# Patient Record
Sex: MALE | Race: BLACK OR AFRICAN AMERICAN | Employment: OTHER | URBAN - METROPOLITAN AREA
[De-identification: names, ages, dates, MRNs, and addresses within clinical notes are randomized per-mention and may not be internally consistent; named-entity substitution may affect disease eponyms.]

---

## 2017-09-14 PROBLEM — I10 UNCONTROLLED HYPERTENSION: Status: ACTIVE | Noted: 2017-09-14

## 2017-09-14 PROBLEM — F10.10 ALCOHOL ABUSE: Status: ACTIVE | Noted: 2017-09-14

## 2019-03-19 PROBLEM — I10 UNCONTROLLED HYPERTENSION: Status: RESOLVED | Noted: 2017-09-14 | Resolved: 2019-03-19

## 2019-03-19 PROBLEM — F10.10 ALCOHOL ABUSE: Status: RESOLVED | Noted: 2017-09-14 | Resolved: 2019-03-19

## 2019-10-09 ENCOUNTER — HOSPITAL ENCOUNTER (OUTPATIENT)
Dept: ULTRASOUND IMAGING | Age: 69
Discharge: HOME | End: 2019-10-09
Attending: FAMILY MEDICINE
Payer: MEDICARE

## 2019-10-09 DIAGNOSIS — F17.200 TOBACCO USE DISORDER: ICD-10-CM

## 2019-10-09 PROCEDURE — 93978 VASCULAR STUDY: CPT

## 2021-02-18 PROBLEM — Z12.11 ENCOUNTER FOR SCREENING COLONOSCOPY: Status: ACTIVE | Noted: 2021-02-18

## 2021-03-20 PROBLEM — Z12.11 ENCOUNTER FOR SCREENING COLONOSCOPY: Status: RESOLVED | Noted: 2021-02-18 | Resolved: 2021-03-20

## 2021-04-20 ENCOUNTER — ANESTHESIA EVENT (OUTPATIENT)
Dept: ENDOSCOPY | Age: 71
End: 2021-04-20
Payer: MEDICARE

## 2021-04-21 ENCOUNTER — ANESTHESIA (OUTPATIENT)
Dept: ENDOSCOPY | Age: 71
End: 2021-04-21
Payer: MEDICARE

## 2021-04-21 ENCOUNTER — HOSPITAL ENCOUNTER (OUTPATIENT)
Age: 71
Setting detail: OUTPATIENT SURGERY
Discharge: HOME | End: 2021-04-21
Attending: SURGERY | Admitting: SURGERY
Payer: MEDICARE

## 2021-04-21 VITALS
OXYGEN SATURATION: 94 % | SYSTOLIC BLOOD PRESSURE: 117 MMHG | HEART RATE: 49 BPM | TEMPERATURE: 98.7 F | RESPIRATION RATE: 16 BRPM | DIASTOLIC BLOOD PRESSURE: 68 MMHG

## 2021-04-21 DIAGNOSIS — Z12.11 ENCOUNTER FOR SCREENING COLONOSCOPY: ICD-10-CM

## 2021-04-21 PROCEDURE — 74011000250 HC RX REV CODE- 250: Performed by: NURSE ANESTHETIST, CERTIFIED REGISTERED

## 2021-04-21 PROCEDURE — 77030013991 HC SNR POLYP ENDOSC BSC -A: Performed by: SURGERY

## 2021-04-21 PROCEDURE — 74011250636 HC RX REV CODE- 250/636: Performed by: NURSE ANESTHETIST, CERTIFIED REGISTERED

## 2021-04-21 PROCEDURE — 45385 COLONOSCOPY W/LESION REMOVAL: CPT | Performed by: SURGERY

## 2021-04-21 PROCEDURE — 77030021593 HC FCPS BIOP ENDOSC BSC -A: Performed by: SURGERY

## 2021-04-21 PROCEDURE — 2709999900 HC NON-CHARGEABLE SUPPLY: Performed by: SURGERY

## 2021-04-21 PROCEDURE — 76060000032 HC ANESTHESIA 0.5 TO 1 HR: Performed by: SURGERY

## 2021-04-21 PROCEDURE — 76040000026: Performed by: SURGERY

## 2021-04-21 PROCEDURE — 74011250636 HC RX REV CODE- 250/636: Performed by: ANESTHESIOLOGY

## 2021-04-21 PROCEDURE — 88305 TISSUE EXAM BY PATHOLOGIST: CPT

## 2021-04-21 RX ORDER — SODIUM CHLORIDE, SODIUM LACTATE, POTASSIUM CHLORIDE, CALCIUM CHLORIDE 600; 310; 30; 20 MG/100ML; MG/100ML; MG/100ML; MG/100ML
100 INJECTION, SOLUTION INTRAVENOUS CONTINUOUS
Status: DISCONTINUED | OUTPATIENT
Start: 2021-04-21 | End: 2021-04-22 | Stop reason: HOSPADM

## 2021-04-21 RX ORDER — PROPOFOL 10 MG/ML
INJECTION, EMULSION INTRAVENOUS
Status: DISCONTINUED | OUTPATIENT
Start: 2021-04-21 | End: 2021-04-21 | Stop reason: HOSPADM

## 2021-04-21 RX ORDER — LIDOCAINE HYDROCHLORIDE 20 MG/ML
INJECTION, SOLUTION EPIDURAL; INFILTRATION; INTRACAUDAL; PERINEURAL AS NEEDED
Status: DISCONTINUED | OUTPATIENT
Start: 2021-04-21 | End: 2021-04-21 | Stop reason: HOSPADM

## 2021-04-21 RX ORDER — PROPOFOL 10 MG/ML
INJECTION, EMULSION INTRAVENOUS AS NEEDED
Status: DISCONTINUED | OUTPATIENT
Start: 2021-04-21 | End: 2021-04-21 | Stop reason: HOSPADM

## 2021-04-21 RX ADMIN — PROPOFOL 300 MCG/KG/MIN: 10 INJECTION, EMULSION INTRAVENOUS at 08:27

## 2021-04-21 RX ADMIN — SODIUM CHLORIDE, SODIUM LACTATE, POTASSIUM CHLORIDE, AND CALCIUM CHLORIDE: 600; 310; 30; 20 INJECTION, SOLUTION INTRAVENOUS at 08:25

## 2021-04-21 RX ADMIN — SODIUM CHLORIDE, SODIUM LACTATE, POTASSIUM CHLORIDE, AND CALCIUM CHLORIDE 100 ML/HR: 600; 310; 30; 20 INJECTION, SOLUTION INTRAVENOUS at 08:04

## 2021-04-21 RX ADMIN — LIDOCAINE HYDROCHLORIDE 60 MG: 20 INJECTION, SOLUTION EPIDURAL; INFILTRATION; INTRACAUDAL; PERINEURAL at 08:27

## 2021-04-21 RX ADMIN — PROPOFOL 50 MG: 10 INJECTION, EMULSION INTRAVENOUS at 08:27

## 2021-04-21 NOTE — OP NOTES
[address] [facility]  OPERATIVE REPORT    Name:  Arley Venegas  MR#:  [id number]  :  1950  ACCOUNT #:  [de-identified]  DATE OF SERVICE:  2021    PREOPERATIVE DIAGNOSIS:  Screening colonoscopy. POSTOPERATIVE DIAGNOSIS:  Two polyps in the ascending colon. PROCEDURE PERFORMED:  Colonoscopy with biopsy of polyp in the proximal ascending colon and snare polypectomy with cautery in the mid ascending colon, otherwise normal exam.    SURGEON:  Mark Arceo. Shahida Hanson MD    ASSISTANT:  None. ANESTHESIA:  MAC.    COMPLICATIONS:  None. SPECIMENS REMOVED:  Biopsies of polyp in the proximal ascending colon and polyp from the mid ascending colon. IMPLANTS:  None. ESTIMATED BLOOD LOSS:  Minimal.    COUNT:  Correct. PROCEDURE:  After the patient was brought into the room, time-out was carried out and all were in agreement. He was rolled onto his left side. MAC anesthesia administered. Scope was gently inserted into the colon. This was passed to the level of the cecum without difficulty. Over 7-minute withdrawal was then done. Finding of a small hyperplastic-appearing polyp was seen in the very proximal ascending colon. This was biopsied multiple times. This was sent to Pathology. Further withdrawal revealed a larger polypoid polyp which was snared and transected completely using electrocautery. This was removed in its entirety. The base of the polypectomy site was dry without bleeding. Polyp was retrieved and stent to Pathology. Slow withdrawal was then done without findings of any other abnormalities. The patient tolerated the procedure well.       Trisha Martini MD      [id number]  D:  2021 9:05  T:  2021 12:52  JOB #:  [id number]  CC:  Abdullahi Chowdhury MD

## 2021-04-21 NOTE — DISCHARGE INSTRUCTIONS
Gastrointestinal Colonoscopy/Flexible Sigmoidoscopy - Lower Exam Discharge Instructions  1. Call Dr. Laya Anderson at his office for any problems or questions. 2. Contact the doctors office for follow up appointment as directed  3. Medication may cause drowsiness for several hours, therefore:  · Do not drive or operate machinery for reminder of the day. · No alcohol today. · Do not make any important or legal decisions for 24 hours. · Do not sign any legal documents for 24 hours. 4. Ordinarily, you may resume regular diet and activity after exam unless otherwise specified by your physician. 5. Because of air put into your colon during exam, you may experience some abdominal distension, relieved by the passage of gas, for several hours. 6. Contact your physician if you have any of the following:  · Excessive amount of bleeding - large amount when having a bowel movement. Occasional specks of blood with bowel movement would not be unusual.  · Severe abdominal pain  · Fever or Chills  · No Aspirin, Advil, Aleve, Nuprin, Ibuprofen, or medications that contain these drugs for 2 weeks. Any additional instructions:  Call the office next week for pathology results, based on pathology repeat colonoscopy in 2-5 years      Instructions given to Denise Mcmullen and other family members.   Instructions given by:

## 2021-04-21 NOTE — ANESTHESIA PREPROCEDURE EVALUATION
Anesthetic History   No history of anesthetic complications            Review of Systems / Medical History  Pertinent labs reviewed    Pulmonary          Smoker         Neuro/Psych   Within defined limits           Cardiovascular    Hypertension              Exercise tolerance: >4 METS     GI/Hepatic/Renal           PUD    Comments: H/o GI bleed Endo/Other             Other Findings   Comments: Heavy EtOH use           Physical Exam    Airway  Mallampati: II  TM Distance: 4 - 6 cm  Neck ROM: normal range of motion   Mouth opening: Normal     Cardiovascular  Regular rate and rhythm,  S1 and S2 normal,  no murmur, click, rub, or gallop             Dental    Dentition: Poor dentition, Upper partial plate and Lower partial plate  Comments: Mult missing teeth   Pulmonary  Breath sounds clear to auscultation               Abdominal  GI exam deferred       Other Findings            Anesthetic Plan    ASA: 3  Anesthesia type: total IV anesthesia          Induction: Intravenous  Anesthetic plan and risks discussed with: Patient

## 2021-04-21 NOTE — PROGRESS NOTES
Spiritual Care visit. Initial Visit, Pre Surgery Consult. Visit and prayer before patient goes to surgery.     Visit by Vu Hanson M.Ed., Th.B. ,Staff

## 2021-04-21 NOTE — H&P
[facility]  Micaela Yin is a 72 y.o. male Patient presents today for screening colonoscopy. Patient denies melena, denies hematochezia, denies abdominal or rectal pain. Patient states bowel habits are good, denies diarrhea or constipation. Good appetite, no unintentional weight loss noted. Denies N/V. Denies CP or SOB. He has had a colonoscopy before but it is years ago when he cannot remember. He thinks he had 2 polyps removed but they were benign.     Does not  have a family history of colon cancer. Does not take blood thinners                Past Medical History:   Diagnosis Date    Alcohol abuse 9/14/2017    Essential hypertension, benign 1/16/2011    Gastrointestinal disorder       ulcer    GI bleed 5/30/2016    Hyponatremia 5/30/2016    Leukocytosis 5/30/2016             Current Outpatient Medications   Medication Sig Dispense Refill    lisinopriL (PRINIVIL, ZESTRIL) 10 mg tablet Take 1 Tab by mouth every morning. 90 Tab 1    atorvastatin (LIPITOR) 40 mg tablet Take 1 Tab by mouth daily. 90 Tab 1    amLODIPine (NORVASC) 5 mg tablet Take 1 Tab by mouth nightly. 90 Tab 1      No Known Allergies  History reviewed. No pertinent surgical history. Family History   Problem Relation Age of Onset   Scheryl Gemma Cancer Mother      Diabetes Mother      Other Father           ulcer    Hypertension Brother      Other Brother           ulcer    Diabetes Sister        Social History            Tobacco Use    Smoking status: Current Some Day Smoker       Packs/day: 0.50    Smokeless tobacco: Never Used    Tobacco comment: working on cutting down and then quitting   Substance Use Topics    Alcohol use: Yes       Alcohol/week: 14.0 standard drinks       Types: 14 Cans of beer per week       Comment: daily         Review of Systems   A comprehensive review of systems was negative except for that written in the HPI.      Physical Exam  Visit Vitals  BP (!) 158/92   Pulse (!) 113   Ht 5' 2.5\" (1.588 m)   Wt 143 lb (64.9 kg)   SpO2 100%   BMI 25.74 kg/m²         Constitutional: Alert, oriented, cooperative patient in no acute distress; appears stated age    Eyes:Sclera are clear. EOMs intact  ENMT: no external lesions gross hearing normal; no obvious neck masses, no ear or lip lesions, nares normal  CV: RRR. Normal perfusion  Resp: No JVD. Breathing is  non-labored; no audible wheezing. GI: soft and non-distended     Musculoskeletal: unremarkable with normal function. No embolic signs or cyanosis. Neuro:  Oriented; moves all 4; no focal deficits  Psychiatric: normal affect and mood, no memory impairment        Labs:      Assessment/Plan:   Rodrigo Bernal is a 72 y.o. male who has signs and symptoms consistent with need for screening colonoscopy. Patient verbalized understanding of importance for bowel prep.      1. Schedule screening colonoscopy        I discussed the patient's condition and treatment options with the patient. I discussed risks of colonoscopy in language the patient could understand including bleeding, infection, aspiration, perforation, medication reaction, need for further endoscopy or surgery, abscess, fistula, SBO, DVT, PE, heart attack, stroke, renal failure, respiratory failure, ventilatory dependence, and death. The patient voiced understanding of all this and all questions were answered. Alternatives to colonoscopy were discussed also and risks of the alternatives. The patient requested that we proceed with colonoscopy. Informed consent was obtained. A copy of this note is sent to the referring physician. I spent 20 minutes with him this morning and greater than 50% of this time was spent in counseling.   The patient was counseled at length about the risks of ricci Covid-19 during their perioperative period and any recovery window from their procedure.  The patient was made aware that ricci Covid-19  may worsen their prognosis for recovering from their procedure and lend to a higher morbidity and/or mortality risk.  All material risks, benefits, and reasonable alternatives including postponing the procedure were discussed.  The patient does  wish to proceed with the procedure at this time.               Electronically signed by Lalit Estrada MD

## 2021-04-21 NOTE — BRIEF OP NOTE
Brief Postoperative Note    Patient: Mao Hooks  YOB: 1950  MRN: [id number]    Date of Procedure: 4/21/2021     Pre-Op Diagnosis: Screen for colon cancer [Z12.11]    Post-Op Diagnosis: 2 polyps ascending colon      Procedure(s):  COLONOSCOPY/ 26  COLON BIOPSY  ENDOSCOPIC POLYPECTOMY hot snare    Surgeon(s):  Lisa Casas MD    Surgical Assistant: None    Anesthesia: MAC     Estimated Blood Loss (mL): Minimal    Complications: None    Specimens:   ID Type Source Tests Collected by Time Destination   1 : proximal ascending colon bx Preservative Colon, Ascending  Lisa Casas MD 4/21/2021 9969 Pathology   2 : mid ascending colon polyp Preservative Colon, Ascending  Lisa Casas MD 4/21/2021 2787 Pathology        Implants: * No implants in log *    Drains: * No LDAs found *    Findings: see op note    Electronically Signed by Patricio Montes De Oca MD on 4/21/2021 at 8:59 AM

## 2021-04-21 NOTE — ANESTHESIA POSTPROCEDURE EVALUATION
Procedure(s):  COLONOSCOPY/ 26  COLON BIOPSY  ENDOSCOPIC POLYPECTOMY hot snare. total IV anesthesia    Anesthesia Post Evaluation        Patient location during evaluation: PACU  Patient participation: complete - patient participated  Level of consciousness: awake  Pain management: adequate  Airway patency: patent  Anesthetic complications: no  Cardiovascular status: acceptable  Respiratory status: acceptable  Hydration status: acceptable  Post anesthesia nausea and vomiting:  controlled  Final Post Anesthesia Temperature Assessment:  Normothermia (36.0-37.5 degrees C)      INITIAL Post-op Vital signs:   Vitals Value Taken Time   /68 04/21/21 0922   Temp     Pulse 60 04/21/21 0930   Resp 16 04/21/21 0922   SpO2 95 % 04/21/21 0930   Vitals shown include unvalidated device data.

## 2022-11-03 ENCOUNTER — OFFICE VISIT (OUTPATIENT)
Dept: FAMILY MEDICINE CLINIC | Facility: CLINIC | Age: 72
End: 2022-11-03
Payer: MEDICARE

## 2022-11-03 VITALS
OXYGEN SATURATION: 100 % | TEMPERATURE: 96.6 F | HEART RATE: 69 BPM | WEIGHT: 144 LBS | BODY MASS INDEX: 22.6 KG/M2 | HEIGHT: 67 IN | SYSTOLIC BLOOD PRESSURE: 184 MMHG | RESPIRATION RATE: 18 BRPM | DIASTOLIC BLOOD PRESSURE: 103 MMHG

## 2022-11-03 DIAGNOSIS — E78.00 PURE HYPERCHOLESTEROLEMIA, UNSPECIFIED: ICD-10-CM

## 2022-11-03 DIAGNOSIS — E78.00 PURE HYPERCHOLESTEROLEMIA, UNSPECIFIED: Primary | ICD-10-CM

## 2022-11-03 DIAGNOSIS — I10 ESSENTIAL (PRIMARY) HYPERTENSION: ICD-10-CM

## 2022-11-03 PROCEDURE — 1123F ACP DISCUSS/DSCN MKR DOCD: CPT | Performed by: FAMILY MEDICINE

## 2022-11-03 PROCEDURE — 3078F DIAST BP <80 MM HG: CPT | Performed by: FAMILY MEDICINE

## 2022-11-03 PROCEDURE — 3074F SYST BP LT 130 MM HG: CPT | Performed by: FAMILY MEDICINE

## 2022-11-03 PROCEDURE — 99214 OFFICE O/P EST MOD 30 MIN: CPT | Performed by: FAMILY MEDICINE

## 2022-11-03 RX ORDER — LISINOPRIL 20 MG/1
20 TABLET ORAL DAILY
Qty: 90 TABLET | Refills: 1 | Status: SHIPPED | OUTPATIENT
Start: 2022-11-03 | End: 2022-11-18 | Stop reason: DRUGHIGH

## 2022-11-03 RX ORDER — ATORVASTATIN CALCIUM 40 MG/1
40 TABLET, FILM COATED ORAL DAILY
Qty: 90 TABLET | Refills: 1 | Status: SHIPPED | OUTPATIENT
Start: 2022-11-03

## 2022-11-03 RX ORDER — AMLODIPINE BESYLATE 5 MG/1
5 TABLET ORAL NIGHTLY
Qty: 90 TABLET | Refills: 1 | Status: SHIPPED | OUTPATIENT
Start: 2022-11-03

## 2022-11-03 ASSESSMENT — ENCOUNTER SYMPTOMS
CONSTIPATION: 0
DIARRHEA: 0
SHORTNESS OF BREATH: 0
ABDOMINAL PAIN: 0
NAUSEA: 0

## 2022-11-03 ASSESSMENT — PATIENT HEALTH QUESTIONNAIRE - PHQ9
SUM OF ALL RESPONSES TO PHQ9 QUESTIONS 1 & 2: 0
SUM OF ALL RESPONSES TO PHQ QUESTIONS 1-9: 0
SUM OF ALL RESPONSES TO PHQ QUESTIONS 1-9: 0
1. LITTLE INTEREST OR PLEASURE IN DOING THINGS: 0
2. FEELING DOWN, DEPRESSED OR HOPELESS: 0
SUM OF ALL RESPONSES TO PHQ QUESTIONS 1-9: 0
SUM OF ALL RESPONSES TO PHQ QUESTIONS 1-9: 0

## 2022-11-03 NOTE — PROGRESS NOTES
Phillip Powell (:  1950) is a 71 y.o. male,Established patient, here for evaluation of the following chief complaint(s):  6 Month Follow-Up (On HTN and Cholesterol) and Other (Has been over 10 years since the last Tdap and Pneumonia shot. Not sure about the Shingles vac.)         ASSESSMENT/PLAN:  1. Pure hypercholesterolemia, unspecified  -     atorvastatin (LIPITOR) 40 MG tablet; Take 1 tablet by mouth daily, Disp-90 tablet, R-1Normal  -     Lipid Panel; Future  2. Essential (primary) hypertension  -     amLODIPine (NORVASC) 5 MG tablet; Take 1 tablet by mouth at bedtime, Disp-90 tablet, R-1Normal  -     lisinopril (PRINIVIL;ZESTRIL) 20 MG tablet; Take 1 tablet by mouth daily, Disp-90 tablet, R-1Normal  -     Comprehensive Metabolic Panel; Future  -     Lipid Panel; Future  Increase lisinopril to 20mg and keep home bp log. Bring home bp log, home monitor, and come back for labs (bmp) and bp monitoring in 2 weeks. Will write a letter if bp appears to truly reflext white coat hypertension. No follow-ups on file. Subjective   SUBJECTIVE/OBJECTIVE:  HPI  Chief Complaint   Patient presents with    6 Month Follow-Up     On HTN and Cholesterol    Other     Has been over 10 years since the last Tdap and Pneumonia shot. Not sure about the Shingles vac. F/U HTN - BP not well controlled. Compliant w/ med(s). Denies s/s target organ damage. 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 F/U  Dyslipidemia -  Pt states compliant w/ regimen w/o medication adverse effects. Review of Systems   Constitutional:  Negative for fatigue and unexpected weight change. HENT:  Negative for congestion. Eyes:  Negative for visual disturbance. Respiratory:  Negative for shortness of breath. Cardiovascular:  Negative for chest pain, palpitations and leg swelling. Gastrointestinal:  Negative for abdominal pain, constipation, diarrhea and nausea. Genitourinary:  Negative for dysuria. Skin:  Negative for rash. Neurological:  Negative for light-headedness. Psychiatric/Behavioral:  Negative for dysphoric mood. Objective   Physical Exam  Vitals reviewed. Constitutional:       Appearance: Normal appearance. HENT:      Head: Normocephalic and atraumatic. Nose: Nose normal.      Mouth/Throat:      Mouth: Mucous membranes are moist.      Pharynx: Oropharynx is clear. Eyes:      Extraocular Movements: Extraocular movements intact. Conjunctiva/sclera: Conjunctivae normal.      Pupils: Pupils are equal, round, and reactive to light. Cardiovascular:      Rate and Rhythm: Normal rate and regular rhythm. Pulses: Normal pulses. Heart sounds: Normal heart sounds. Pulmonary:      Effort: Pulmonary effort is normal.      Breath sounds: Normal breath sounds. Abdominal:      General: Abdomen is flat. Bowel sounds are normal.      Palpations: Abdomen is soft. Musculoskeletal:         General: Normal range of motion. Skin:     General: Skin is warm and dry. Neurological:      General: No focal deficit present. Mental Status: He is alert. BP (!) 184/103 (Site: Left Upper Arm, Position: Sitting, Cuff Size: Medium Adult)   Pulse 69   Temp (!) 96.6 °F (35.9 °C)   Resp 18   Ht 5' 7\" (1.702 m)   Wt 144 lb (65.3 kg)   SpO2 100%   BMI 22.55 kg/m²          An electronic signature was used to authenticate this note.     --Moriah Jeter MD

## 2022-11-04 LAB
CHOLEST SERPL-MCNC: 186 MG/DL
HDLC SERPL-MCNC: 93 MG/DL (ref 40–60)
HDLC SERPL: 2 {RATIO}
LDLC SERPL CALC-MCNC: 65 MG/DL
TRIGL SERPL-MCNC: 140 MG/DL (ref 35–150)
VLDLC SERPL CALC-MCNC: 28 MG/DL (ref 6–23)

## 2022-11-17 ENCOUNTER — NURSE ONLY (OUTPATIENT)
Dept: FAMILY MEDICINE CLINIC | Facility: CLINIC | Age: 72
End: 2022-11-17

## 2022-11-17 ENCOUNTER — TELEPHONE (OUTPATIENT)
Dept: FAMILY MEDICINE CLINIC | Facility: CLINIC | Age: 72
End: 2022-11-17

## 2022-11-17 VITALS — SYSTOLIC BLOOD PRESSURE: 175 MMHG | DIASTOLIC BLOOD PRESSURE: 97 MMHG

## 2022-11-17 DIAGNOSIS — I10 ESSENTIAL (PRIMARY) HYPERTENSION: ICD-10-CM

## 2022-11-17 NOTE — TELEPHONE ENCOUNTER
Patient came in today to have his blood pressure checked. It was 175/97 in the left arm with a medium cuff. He brought in him own blood pressure machine, but the batteries were dead. He did bring a list of reading he got at home as well. S: 49 y.o. male here for HTN, HLD, GERD, substance use. Lopressor, hydralazine, clonidine, been out of med for the last few days. Asx. GERD. pepcid daily. Worse with certain foods. 24 beers y'day. No w/d sxs   Black and mild, 10 per wk. Urinary retention sxs. No FHx prostate CA  CKD, not following Nephro. Baseline Cr 1.3    O: VS: BP (!) 207/114   Pulse 76   Temp 98.6 °F (37 °C)   Resp 16   Ht 6' 1\" (1.854 m)   Wt 179 lb (81.2 kg)   SpO2 98%   BMI 23.62 kg/m²    General: NAD, alert and interacting appropriately. CV:  RRR, no gallops, rubs, or murmurs    Resp: CTAB   Abd:  Soft, nontender   Ext:  No edema    Impression: HTN. HLD, GERD, substance use. LUTS  Plan:   Stay off clonidine, start losartan  Follow GERD diet  Cut down EtOH and tobacco, counseling provided  Diagnostic psa, UA and UCx  cscope  HTN likely 2/2 rebound from stopping clonidine + EtOH w/d  rtc 2 wks for HTN and bmp on losartan    Attending Physician Statement  I have discussed the case, including pertinent history and exam findings with the resident. I agree with the documented assessment and plan.

## 2022-11-17 NOTE — TELEPHONE ENCOUNTER
Will scan home bps into his media. Most readings were normal, but some were >691 systolic. We could increase his lisinopril to 30mg and see how he feels and what his home numbers do. Let me know if he is open to trying this for a month.

## 2022-11-18 LAB
ALBUMIN SERPL-MCNC: 4 G/DL (ref 3.2–4.6)
ALBUMIN/GLOB SERPL: 1.3 {RATIO} (ref 0.4–1.6)
ALP SERPL-CCNC: 72 U/L (ref 50–136)
ALT SERPL-CCNC: 47 U/L (ref 12–65)
ANION GAP SERPL CALC-SCNC: 3 MMOL/L (ref 2–11)
AST SERPL-CCNC: 24 U/L (ref 15–37)
BILIRUB SERPL-MCNC: 0.6 MG/DL (ref 0.2–1.1)
BUN SERPL-MCNC: 16 MG/DL (ref 8–23)
CALCIUM SERPL-MCNC: 10.1 MG/DL (ref 8.3–10.4)
CHLORIDE SERPL-SCNC: 110 MMOL/L (ref 101–110)
CO2 SERPL-SCNC: 26 MMOL/L (ref 21–32)
CREAT SERPL-MCNC: 1 MG/DL (ref 0.8–1.5)
GLOBULIN SER CALC-MCNC: 3.1 G/DL (ref 2.8–4.5)
GLUCOSE SERPL-MCNC: 104 MG/DL (ref 65–100)
POTASSIUM SERPL-SCNC: 4 MMOL/L (ref 3.5–5.1)
PROT SERPL-MCNC: 7.1 G/DL (ref 6.3–8.2)
SODIUM SERPL-SCNC: 139 MMOL/L (ref 133–143)

## 2022-11-18 RX ORDER — LISINOPRIL 30 MG/1
30 TABLET ORAL DAILY
Qty: 30 TABLET | Refills: 0 | Status: SHIPPED | OUTPATIENT
Start: 2022-11-18

## 2022-11-18 NOTE — TELEPHONE ENCOUNTER
I called the patient and spoke to him about this. He is wanting to do the 30 mg dose, and wants it sent to his pharmacy.

## 2022-12-12 NOTE — TELEPHONE ENCOUNTER
I called the patient and spoke to him about this. [place] and didn't have his machine with him, so he could not tell how his blood pressure have been running. I set him up for the blood work and asked him to bring the readings in with him that day. He stated understanding.

## 2022-12-15 ENCOUNTER — TELEPHONE (OUTPATIENT)
Dept: FAMILY MEDICINE CLINIC | Facility: CLINIC | Age: 72
End: 2022-12-15

## 2022-12-15 DIAGNOSIS — I10 ESSENTIAL (PRIMARY) HYPERTENSION: ICD-10-CM

## 2022-12-15 LAB
ANION GAP SERPL CALC-SCNC: 4 MMOL/L (ref 2–11)
BUN SERPL-MCNC: 14 MG/DL (ref 8–23)
CALCIUM SERPL-MCNC: 9.7 MG/DL (ref 8.3–10.4)
CHLORIDE SERPL-SCNC: 110 MMOL/L (ref 101–110)
CO2 SERPL-SCNC: 26 MMOL/L (ref 21–32)
CREAT SERPL-MCNC: 1 MG/DL (ref 0.8–1.5)
GLUCOSE SERPL-MCNC: 99 MG/DL (ref 65–100)
POTASSIUM SERPL-SCNC: 4.5 MMOL/L (ref 3.5–5.1)
SODIUM SERPL-SCNC: 140 MMOL/L (ref 133–143)

## 2022-12-15 NOTE — TELEPHONE ENCOUNTER
Patient came in today to have his blood work done, and he brought him home blood pressure readings in. He has about 7 pills left of the Lisinopril 30 mg left.

## 2022-12-15 NOTE — TELEPHONE ENCOUNTER
Those bps look great! Stay at current dose and we will see what his labs are then, refill his lisinopril.

## 2023-04-17 RX ORDER — LISINOPRIL 30 MG/1
30 TABLET ORAL DAILY
Qty: 30 TABLET | Refills: 0 | Status: SHIPPED | OUTPATIENT
Start: 2023-04-17

## 2023-04-17 NOTE — TELEPHONE ENCOUNTER
Emy Posey stopped by because he needs a refill on his lisinopril. He requested it be sent over to the [facility] on [address].

## 2023-04-18 NOTE — TELEPHONE ENCOUNTER
I called the patient and spoke to him about this. He stated that he never go the 30 mg dose of the Lisinopril that was sent in. He has still been taking the 20 mg. I set him up for a virtual visit to follow up and he will be getting the 30 mg dose that was sent in yesterday. He will also be checking his blood pressure readings every day leading up to the visit to see how they run.

## 2023-05-09 ENCOUNTER — TELEMEDICINE (OUTPATIENT)
Dept: FAMILY MEDICINE CLINIC | Facility: CLINIC | Age: 73
End: 2023-05-09
Payer: MEDICARE

## 2023-05-09 DIAGNOSIS — E78.00 PURE HYPERCHOLESTEROLEMIA, UNSPECIFIED: ICD-10-CM

## 2023-05-09 DIAGNOSIS — Z00.00 MEDICARE ANNUAL WELLNESS VISIT, SUBSEQUENT: Primary | ICD-10-CM

## 2023-05-09 DIAGNOSIS — I10 ESSENTIAL (PRIMARY) HYPERTENSION: ICD-10-CM

## 2023-05-09 PROCEDURE — 1123F ACP DISCUSS/DSCN MKR DOCD: CPT | Performed by: FAMILY MEDICINE

## 2023-05-09 PROCEDURE — G0439 PPPS, SUBSEQ VISIT: HCPCS | Performed by: FAMILY MEDICINE

## 2023-05-09 PROCEDURE — 3017F COLORECTAL CA SCREEN DOC REV: CPT | Performed by: FAMILY MEDICINE

## 2023-05-09 PROCEDURE — 99213 OFFICE O/P EST LOW 20 MIN: CPT | Performed by: FAMILY MEDICINE

## 2023-05-09 PROCEDURE — G8427 DOCREV CUR MEDS BY ELIG CLIN: HCPCS | Performed by: FAMILY MEDICINE

## 2023-05-09 PROCEDURE — G8420 CALC BMI NORM PARAMETERS: HCPCS | Performed by: FAMILY MEDICINE

## 2023-05-09 PROCEDURE — 4004F PT TOBACCO SCREEN RCVD TLK: CPT | Performed by: FAMILY MEDICINE

## 2023-05-09 RX ORDER — AMLODIPINE BESYLATE 5 MG/1
5 TABLET ORAL NIGHTLY
Qty: 90 TABLET | Refills: 1 | Status: SHIPPED | OUTPATIENT
Start: 2023-05-09

## 2023-05-09 RX ORDER — ATORVASTATIN CALCIUM 40 MG/1
40 TABLET, FILM COATED ORAL DAILY
Qty: 90 TABLET | Refills: 1 | Status: SHIPPED | OUTPATIENT
Start: 2023-05-09

## 2023-05-09 RX ORDER — LISINOPRIL 30 MG/1
30 TABLET ORAL DAILY
Qty: 90 TABLET | Refills: 1 | Status: SHIPPED | OUTPATIENT
Start: 2023-05-09

## 2023-05-09 ASSESSMENT — LIFESTYLE VARIABLES
HAS A RELATIVE, FRIEND, DOCTOR, OR ANOTHER HEALTH PROFESSIONAL EXPRESSED CONCERN ABOUT YOUR DRINKING OR SUGGESTED YOU CUT DOWN: 0
HOW MANY STANDARD DRINKS CONTAINING ALCOHOL DO YOU HAVE ON A TYPICAL DAY: 1 OR 2
HOW OFTEN DURING THE LAST YEAR HAVE YOU FAILED TO DO WHAT WAS NORMALLY EXPECTED FROM YOU BECAUSE OF DRINKING: 0
HOW OFTEN DURING THE LAST YEAR HAVE YOU HAD A FEELING OF GUILT OR REMORSE AFTER DRINKING: 0
HOW OFTEN DURING THE LAST YEAR HAVE YOU BEEN UNABLE TO REMEMBER WHAT HAPPENED THE NIGHT BEFORE BECAUSE YOU HAD BEEN DRINKING: 0
HOW OFTEN DO YOU HAVE A DRINK CONTAINING ALCOHOL: 4 OR MORE TIMES A WEEK
HOW OFTEN DURING THE LAST YEAR HAVE YOU NEEDED AN ALCOHOLIC DRINK FIRST THING IN THE MORNING TO GET YOURSELF GOING AFTER A NIGHT OF HEAVY DRINKING: 0
HAVE YOU OR SOMEONE ELSE BEEN INJURED AS A RESULT OF YOUR DRINKING: 0
HOW OFTEN DURING THE LAST YEAR HAVE YOU FOUND THAT YOU WERE NOT ABLE TO STOP DRINKING ONCE YOU HAD STARTED: 0

## 2023-05-09 ASSESSMENT — PATIENT HEALTH QUESTIONNAIRE - PHQ9
SUM OF ALL RESPONSES TO PHQ QUESTIONS 1-9: 0
SUM OF ALL RESPONSES TO PHQ9 QUESTIONS 1 & 2: 0
1. LITTLE INTEREST OR PLEASURE IN DOING THINGS: 0
2. FEELING DOWN, DEPRESSED OR HOPELESS: 0
SUM OF ALL RESPONSES TO PHQ QUESTIONS 1-9: 0

## 2023-05-09 NOTE — PROGRESS NOTES
where indicated follow up appointments were made and/or referrals ordered. Positive Risk Factor Screenings with Interventions:               General HRA Questions:  Select all that apply: (!) New or Increased Pain (On the left hip side.)    Pain Interventions:  Medication adjusted: tylenol and voltaren for pain and inflamamtion. F/u in office should pain not improve in the hip. Vision Screen:  Do you have difficulty driving, watching TV, or doing any of your daily activities because of your eyesight?: No  Have you had an eye exam within the past year?: (!) No  No results found. Interventions:   Patient declines any further evaluation or treatment    Safety:  Do you have either shower bars, grab bars, non-slip mats or non-slip surfaces in your shower or bathtub?: (!) No  Interventions:  Patient declined any further interventions or treatment      Tobacco Use:  Tobacco Use: High Risk    Smoking Tobacco Use: Some Days    Smokeless Tobacco Use: Current    Passive Exposure: Not on file     E-cigarette/Vaping       Questions Responses    E-cigarette/Vaping Use     Start Date     Passive Exposure     Quit Date     Counseling Given     Comments           Interventions:  Patient declined any further intervention or treatment            Objective      Patient-Reported Vitals  Patient-Reported Systolic (Top): 726 mmHg  Patient-Reported Diastolic (Bottom): 79 mmHg  BP Observations: Yes, BP was taken on electronic monitoring device with digital readout  Patient-Reported Pulse: 86  Patient-Reported Weight: 140              No Known Allergies  Prior to Visit Medications    Medication Sig Taking?  Authorizing Provider   lisinopril (PRINIVIL;ZESTRIL) 30 MG tablet Take 1 tablet by mouth daily Yes Дмитрий Vidal MD   amLODIPine (NORVASC) 5 MG tablet Take 1 tablet by mouth at bedtime Yes Дмитрий Vidal MD   atorvastatin (LIPITOR) 40 MG tablet Take 1 tablet by mouth daily Yes Дмитрий Vidal MD

## 2023-05-09 NOTE — PATIENT INSTRUCTIONS
Learning About Vision Tests  What are vision tests? The four most common vision tests are visual acuity tests, refraction, visual field tests, and color vision tests. Visual acuity (sharpness) tests  These tests are used: To see if you need glasses or contact lenses. To monitor an eye problem. To check an eye injury. Visual acuity tests are done as part of routine exams. You may also have this test when you get your 's license or apply for some types of jobs. Visual field tests  These tests are used: To check for vision loss in any area of your range of vision. To screen for certain eye diseases. To look for nerve damage after a stroke, head injury, or other problem that could reduce blood flow to the brain. Refraction and color tests  A refraction test is done to find the right prescription for glasses and contact lenses. A color vision test is done to check for color blindness. Color vision is often tested as part of a routine exam. You may also have this test when you apply for a job where recognizing different colors is important, such as , electronics, or the [employer]. How are vision tests done? Visual acuity test   You cover one eye at a time. You read aloud from a wall chart across the room. You read aloud from a small card that you hold in your hand. Refraction   You look into a special device. The device puts lenses of different strengths in front of each eye to see how strong your glasses or contact lenses need to be. Visual field tests   Your doctor may have you look through special machines. Or your doctor may simply have you stare straight ahead while they move a finger into and out of your field of vision. Color vision test   You look at pieces of printed test patterns in various colors. You say what number or symbol you see. Your doctor may have you trace the number or symbol using a pointer. How do these tests feel?   There is very little chance of

## 2023-05-10 LAB
ALBUMIN SERPL-MCNC: 4.1 G/DL (ref 3.2–4.6)
ALBUMIN/GLOB SERPL: 1.2 (ref 0.4–1.6)
ALP SERPL-CCNC: 65 U/L (ref 50–136)
ALT SERPL-CCNC: 54 U/L (ref 12–65)
ANION GAP SERPL CALC-SCNC: 4 MMOL/L (ref 2–11)
AST SERPL-CCNC: 28 U/L (ref 15–37)
BILIRUB SERPL-MCNC: 0.5 MG/DL (ref 0.2–1.1)
BUN SERPL-MCNC: 17 MG/DL (ref 8–23)
CALCIUM SERPL-MCNC: 10.3 MG/DL (ref 8.3–10.4)
CHLORIDE SERPL-SCNC: 108 MMOL/L (ref 101–110)
CHOLEST SERPL-MCNC: 147 MG/DL
CO2 SERPL-SCNC: 26 MMOL/L (ref 21–32)
CREAT SERPL-MCNC: 1.2 MG/DL (ref 0.8–1.5)
GLOBULIN SER CALC-MCNC: 3.5 G/DL (ref 2.8–4.5)
GLUCOSE SERPL-MCNC: 95 MG/DL (ref 65–100)
HDLC SERPL-MCNC: 71 MG/DL (ref 40–60)
HDLC SERPL: 2.1
LDLC SERPL CALC-MCNC: 62.6 MG/DL
POTASSIUM SERPL-SCNC: 4.6 MMOL/L (ref 3.5–5.1)
PROT SERPL-MCNC: 7.6 G/DL (ref 6.3–8.2)
SODIUM SERPL-SCNC: 138 MMOL/L (ref 133–143)
TRIGL SERPL-MCNC: 67 MG/DL (ref 35–150)
VLDLC SERPL CALC-MCNC: 13.4 MG/DL (ref 6–23)

## 2023-06-01 ENCOUNTER — TELEPHONE (OUTPATIENT)
Dept: FAMILY MEDICINE CLINIC | Facility: CLINIC | Age: 73
End: 2023-06-01

## 2023-06-01 DIAGNOSIS — R68.89 ABNORMAL ANKLE BRACHIAL INDEX (ABI): Primary | ICD-10-CM

## 2023-06-01 NOTE — TELEPHONE ENCOUNTER
[facility] Help to Those in Need  [phone]    Patient Name: Chiquita Goodrich  YOB: 1947    Date of Provider Hospice Visit: 01/11/19    Level of Care:   [x] General Inpatient (GIP)    [] Routine   [] Respite    Current Location of Care:  [] [facility] [] [facility] [] [facility] [] [facility] [x] Hospice House [facility])    IF [facility], patient referred from:  [] [facility] [] [facility] [] [facility] [] [facility] [] Home [] Other:     Date of Original Hospice Admission: 12/27/18  Hospice Medical Director at time of admission: Dr Reva Estrella Diagnosis: Liver failure without hepatic coma, unspecified chronicity   Diagnoses RELATED to the terminal prognosis: Cirrhosis of liver without ascites, unspecified hepatic cirrhosis          HOSPICE SUMMARY        Chiquita Goodrich is a 80y.o. year old who was admitted to [facility] and is at the [facility] for originally respite care. pts symptoms became unmanaged at home and family was unable to provide the needed care. She was transferred to [facility] and her level of care is now changed from respite to GIP. Medications are now scheduled for increased effect and comfort. .     The patient's principle diagnosis has resulted in altered mental status, excessive airway secretions, anorexia, profound cachexia, shortness of breath, agitation, pt is actively dying. The LCD for Hospice for the admitted diagnosis of Liver Disease includes:  1 and 2 should be present; factors from 3 will lend supporting documentation    1. The patient should show both a and b:    [] a. Prothrombin time prolonged more than 5 seconds over control, or International Normalized Ratio (INR) >1.5  [x] b.  Serum I called the patient about this, but got his voice mail box. I left a message asking him to please call back about this. [facility] changed to [facility]. [facility] [address] 3/18/2010    CVA (cerebral infarction) 3/18/2010    DM (diabetes mellitus) ([facility] [address] 3/18/2010    Falls     HBP (high blood pressure) 3/18/2010    Hepatic encephalopathy (HCC)     Hepatitis C 3/18/2010    Interferon deficiency ([facility] [address] sebastien    normal    HX LUIS AND BSO  1965    LUIS yaniv Finley NP

## 2023-06-01 NOTE — TELEPHONE ENCOUNTER
Please call to inform him of a mildly abnormal diana test done by [facility]. I am ordering another test to confirm. Also, please ask him if he has pain in his legs, particularly with activity.

## 2024-01-03 DIAGNOSIS — I10 ESSENTIAL (PRIMARY) HYPERTENSION: ICD-10-CM

## 2024-01-03 DIAGNOSIS — E78.00 PURE HYPERCHOLESTEROLEMIA, UNSPECIFIED: ICD-10-CM

## 2024-01-03 RX ORDER — AMLODIPINE BESYLATE 5 MG/1
5 TABLET ORAL NIGHTLY
Qty: 30 TABLET | Refills: 0 | Status: SHIPPED | OUTPATIENT
Start: 2024-01-03

## 2024-01-03 RX ORDER — LISINOPRIL 30 MG/1
30 TABLET ORAL DAILY
Qty: 30 TABLET | Refills: 0 | Status: SHIPPED | OUTPATIENT
Start: 2024-01-03

## 2024-01-03 RX ORDER — ATORVASTATIN CALCIUM 40 MG/1
40 TABLET, FILM COATED ORAL DAILY
Qty: 90 TABLET | Refills: 1 | OUTPATIENT
Start: 2024-01-03

## 2024-01-03 RX ORDER — AMLODIPINE BESYLATE 5 MG/1
5 TABLET ORAL NIGHTLY
Qty: 90 TABLET | Refills: 1 | OUTPATIENT
Start: 2024-01-03

## 2024-01-03 RX ORDER — ATORVASTATIN CALCIUM 40 MG/1
40 TABLET, FILM COATED ORAL DAILY
Qty: 30 TABLET | Refills: 0 | Status: SHIPPED | OUTPATIENT
Start: 2024-01-03

## 2024-01-03 RX ORDER — LISINOPRIL 30 MG/1
30 TABLET ORAL DAILY
Qty: 90 TABLET | Refills: 1 | OUTPATIENT
Start: 2024-01-03

## 2024-01-03 NOTE — TELEPHONE ENCOUNTER
I called the patient and spoke to him about this. He stated understanding. Appointment was made for 1-25-24, and is out of all his medication at this time.

## 2024-01-25 ENCOUNTER — OFFICE VISIT (OUTPATIENT)
Dept: FAMILY MEDICINE CLINIC | Facility: CLINIC | Age: 74
End: 2024-01-25
Payer: MEDICARE

## 2024-01-25 VITALS
SYSTOLIC BLOOD PRESSURE: 170 MMHG | WEIGHT: 140 LBS | TEMPERATURE: 97.2 F | OXYGEN SATURATION: 99 % | HEIGHT: 67 IN | DIASTOLIC BLOOD PRESSURE: 103 MMHG | HEART RATE: 92 BPM | BODY MASS INDEX: 21.97 KG/M2 | RESPIRATION RATE: 18 BRPM

## 2024-01-25 DIAGNOSIS — E78.00 PURE HYPERCHOLESTEROLEMIA, UNSPECIFIED: ICD-10-CM

## 2024-01-25 DIAGNOSIS — Z87.891 PERSONAL HISTORY OF TOBACCO USE: Primary | ICD-10-CM

## 2024-01-25 DIAGNOSIS — I10 ESSENTIAL (PRIMARY) HYPERTENSION: ICD-10-CM

## 2024-01-25 PROCEDURE — G8484 FLU IMMUNIZE NO ADMIN: HCPCS | Performed by: FAMILY MEDICINE

## 2024-01-25 PROCEDURE — 1123F ACP DISCUSS/DSCN MKR DOCD: CPT | Performed by: FAMILY MEDICINE

## 2024-01-25 PROCEDURE — G8427 DOCREV CUR MEDS BY ELIG CLIN: HCPCS | Performed by: FAMILY MEDICINE

## 2024-01-25 PROCEDURE — 3077F SYST BP >= 140 MM HG: CPT | Performed by: FAMILY MEDICINE

## 2024-01-25 PROCEDURE — 3017F COLORECTAL CA SCREEN DOC REV: CPT | Performed by: FAMILY MEDICINE

## 2024-01-25 PROCEDURE — G0296 VISIT TO DETERM LDCT ELIG: HCPCS | Performed by: FAMILY MEDICINE

## 2024-01-25 PROCEDURE — 3080F DIAST BP >= 90 MM HG: CPT | Performed by: FAMILY MEDICINE

## 2024-01-25 PROCEDURE — 4004F PT TOBACCO SCREEN RCVD TLK: CPT | Performed by: FAMILY MEDICINE

## 2024-01-25 PROCEDURE — G8420 CALC BMI NORM PARAMETERS: HCPCS | Performed by: FAMILY MEDICINE

## 2024-01-25 PROCEDURE — 99214 OFFICE O/P EST MOD 30 MIN: CPT | Performed by: FAMILY MEDICINE

## 2024-01-25 RX ORDER — AMLODIPINE BESYLATE 5 MG/1
5 TABLET ORAL NIGHTLY
Qty: 90 TABLET | Refills: 1 | Status: SHIPPED | OUTPATIENT
Start: 2024-01-25

## 2024-01-25 RX ORDER — ATORVASTATIN CALCIUM 40 MG/1
40 TABLET, FILM COATED ORAL DAILY
Qty: 90 TABLET | Refills: 1 | Status: SHIPPED | OUTPATIENT
Start: 2024-01-25

## 2024-01-25 RX ORDER — LISINOPRIL 30 MG/1
30 TABLET ORAL DAILY
Qty: 90 TABLET | Refills: 1 | Status: SHIPPED | OUTPATIENT
Start: 2024-01-25

## 2024-01-25 SDOH — ECONOMIC STABILITY: INCOME INSECURITY: HOW HARD IS IT FOR YOU TO PAY FOR THE VERY BASICS LIKE FOOD, HOUSING, MEDICAL CARE, AND HEATING?: NOT HARD AT ALL

## 2024-01-25 SDOH — ECONOMIC STABILITY: FOOD INSECURITY: WITHIN THE PAST 12 MONTHS, THE FOOD YOU BOUGHT JUST DIDN'T LAST AND YOU DIDN'T HAVE MONEY TO GET MORE.: NEVER TRUE

## 2024-01-25 SDOH — ECONOMIC STABILITY: HOUSING INSECURITY
IN THE LAST 12 MONTHS, WAS THERE A TIME WHEN YOU DID NOT HAVE A STEADY PLACE TO SLEEP OR SLEPT IN A SHELTER (INCLUDING NOW)?: NO

## 2024-01-25 SDOH — ECONOMIC STABILITY: FOOD INSECURITY: WITHIN THE PAST 12 MONTHS, YOU WORRIED THAT YOUR FOOD WOULD RUN OUT BEFORE YOU GOT MONEY TO BUY MORE.: NEVER TRUE

## 2024-01-25 ASSESSMENT — PATIENT HEALTH QUESTIONNAIRE - PHQ9
1. LITTLE INTEREST OR PLEASURE IN DOING THINGS: 0
2. FEELING DOWN, DEPRESSED OR HOPELESS: 0
SUM OF ALL RESPONSES TO PHQ QUESTIONS 1-9: 0
SUM OF ALL RESPONSES TO PHQ9 QUESTIONS 1 & 2: 0
SUM OF ALL RESPONSES TO PHQ QUESTIONS 1-9: 0

## 2024-01-25 NOTE — PROGRESS NOTES
that he has been smoking cigarettes. He started smoking about 54 years ago. He has a 27.0 pack-year smoking history. He uses smokeless tobacco.. Discussed with patient the risks and benefits of screening, including over-diagnosis, false positive rate, and total radiation exposure.  The patient currently exhibits no signs or symptoms suggestive of lung cancer.  Discussed with patient the importance of compliance with yearly annual lung cancer screenings and willingness to undergo diagnosis and treatment if screening scan is positive.  In addition, the patient was counseled regarding the importance of remaining smoke free and/or total smoking cessation.    Also reviewed the following if the patient has Medicare that as of February 10, 2022, Medicare only covers LDCT screening in patients aged 50-77 with at least a 20 pack-year smoking history who currently smoke or have quit in the last 15 years

## 2024-02-05 ENCOUNTER — HOSPITAL ENCOUNTER (OUTPATIENT)
Dept: CT IMAGING | Age: 74
Discharge: HOME | End: 2024-02-08
Attending: FAMILY MEDICINE
Payer: MEDICARE

## 2024-02-05 ENCOUNTER — HOSPITAL ENCOUNTER (OUTPATIENT)
Dept: ULTRASOUND IMAGING | Age: 74
Discharge: HOME | End: 2024-02-08
Attending: FAMILY MEDICINE
Payer: MEDICARE

## 2024-02-05 DIAGNOSIS — Z87.891 PERSONAL HISTORY OF TOBACCO USE: ICD-10-CM

## 2024-02-05 DIAGNOSIS — R68.89 ABNORMAL ANKLE BRACHIAL INDEX (ABI): ICD-10-CM

## 2024-02-05 PROCEDURE — 71271 CT THORAX LUNG CANCER SCR C-: CPT

## 2024-02-19 ENCOUNTER — HOSPITAL ENCOUNTER (OUTPATIENT)
Dept: ULTRASOUND IMAGING | Age: 74
Discharge: HOME | End: 2024-02-22
Attending: FAMILY MEDICINE
Payer: MEDICARE

## 2024-02-19 PROCEDURE — 93922 UPR/L XTREMITY ART 2 LEVELS: CPT

## 2024-02-19 PROCEDURE — 93922 UPR/L XTREMITY ART 2 LEVELS: CPT | Performed by: RADIOLOGY

## 2024-05-23 ENCOUNTER — OFFICE VISIT (OUTPATIENT)
Dept: FAMILY MEDICINE CLINIC | Facility: CLINIC | Age: 74
End: 2024-05-23

## 2024-05-23 VITALS
OXYGEN SATURATION: 100 % | TEMPERATURE: 97.1 F | DIASTOLIC BLOOD PRESSURE: 85 MMHG | HEART RATE: 80 BPM | BODY MASS INDEX: 21.97 KG/M2 | WEIGHT: 140 LBS | RESPIRATION RATE: 18 BRPM | SYSTOLIC BLOOD PRESSURE: 129 MMHG | HEIGHT: 67 IN

## 2024-05-23 DIAGNOSIS — E78.00 PURE HYPERCHOLESTEROLEMIA, UNSPECIFIED: ICD-10-CM

## 2024-05-23 DIAGNOSIS — K06.8 PAIN IN GUMS: Primary | ICD-10-CM

## 2024-05-23 DIAGNOSIS — I10 ESSENTIAL (PRIMARY) HYPERTENSION: ICD-10-CM

## 2024-05-23 RX ORDER — AMLODIPINE BESYLATE 5 MG/1
5 TABLET ORAL NIGHTLY
Qty: 90 TABLET | Refills: 1 | Status: SHIPPED | OUTPATIENT
Start: 2024-05-23

## 2024-05-23 RX ORDER — ATORVASTATIN CALCIUM 40 MG/1
40 TABLET, FILM COATED ORAL DAILY
Qty: 90 TABLET | Refills: 1 | Status: SHIPPED | OUTPATIENT
Start: 2024-05-23

## 2024-05-23 RX ORDER — LISINOPRIL 30 MG/1
30 TABLET ORAL DAILY
Qty: 90 TABLET | Refills: 1 | Status: SHIPPED | OUTPATIENT
Start: 2024-05-23

## 2024-05-23 ASSESSMENT — ENCOUNTER SYMPTOMS
ABDOMINAL PAIN: 0
NAUSEA: 0
DIARRHEA: 0
SHORTNESS OF BREATH: 0
CONSTIPATION: 0

## 2024-05-23 NOTE — PROGRESS NOTES
Sid Serna (:  1950) is a 73 y.o. male,Established patient, here for evaluation of the following chief complaint(s):  Follow-up (On HTN and Cholesterol. Had blood work prior to the visit.), Oral Swelling (Right side is red and swollen, for the last 2-3 days.), and Other (Has been over 10 years since the last Dtap and Pneumnoia vac. Has yet to have the Shingles vac)      Assessment & Plan   1. Pain in gums  2. Essential (primary) hypertension  -     amLODIPine (NORVASC) 5 MG tablet; Take 1 tablet by mouth at bedtime, Disp-90 tablet, R-1Normal  -     lisinopril (PRINIVIL;ZESTRIL) 30 MG tablet; Take 1 tablet by mouth daily, Disp-90 tablet, R-1Normal  3. Pure hypercholesterolemia, unspecified  -     atorvastatin (LIPITOR) 40 MG tablet; Take 1 tablet by mouth daily, Disp-90 tablet, R-1Normal  Soft diet to include protein until he gets his dental situation sorted out.     No follow-ups on file.       Subjective   HPI  Chief Complaint   Patient presents with    Follow-up     On HTN and Cholesterol. Had blood work prior to the visit.    Oral Swelling     Right side is red and swollen, for the last 2-3 days.    Other     Has been over 10 years since the last Dtap and Pneumnoia vac. Has yet to have the Shingles vac     F/U HTN - BP well controlled. Compliant w/ med(s).  Denies s/s target organ damage.   F/U  Dyslipidemia -  Pt states compliant w/ regimen w/o medication adverse effects.     Partial is causing pain in the right upper gumline.  He is waiting to get into a dentist who is in network.  Thinks his partial rubbed a sore area.  Denies any fever or foul smell or white plaques on his gums/throat/mouth.     Review of Systems   Constitutional:  Negative for fatigue and unexpected weight change.   HENT:  Negative for congestion.    Eyes:  Negative for visual disturbance.   Respiratory:  Negative for shortness of breath.    Cardiovascular:  Negative for chest pain, palpitations and leg swelling.

## 2024-12-06 SDOH — HEALTH STABILITY: PHYSICAL HEALTH: ON AVERAGE, HOW MANY DAYS PER WEEK DO YOU ENGAGE IN MODERATE TO STRENUOUS EXERCISE (LIKE A BRISK WALK)?: 1 DAY

## 2024-12-06 SDOH — HEALTH STABILITY: PHYSICAL HEALTH: ON AVERAGE, HOW MANY MINUTES DO YOU ENGAGE IN EXERCISE AT THIS LEVEL?: 30 MIN

## 2024-12-06 ASSESSMENT — PATIENT HEALTH QUESTIONNAIRE - PHQ9
2. FEELING DOWN, DEPRESSED OR HOPELESS: NOT AT ALL
1. LITTLE INTEREST OR PLEASURE IN DOING THINGS: NOT AT ALL
SUM OF ALL RESPONSES TO PHQ QUESTIONS 1-9: 0
SUM OF ALL RESPONSES TO PHQ9 QUESTIONS 1 & 2: 0

## 2024-12-06 ASSESSMENT — LIFESTYLE VARIABLES
HOW MANY STANDARD DRINKS CONTAINING ALCOHOL DO YOU HAVE ON A TYPICAL DAY: PATIENT DECLINED
HOW OFTEN DO YOU HAVE A DRINK CONTAINING ALCOHOL: PATIENT DECLINED

## 2025-01-01 SDOH — HEALTH STABILITY: PHYSICAL HEALTH: ON AVERAGE, HOW MANY DAYS PER WEEK DO YOU ENGAGE IN MODERATE TO STRENUOUS EXERCISE (LIKE A BRISK WALK)?: 1 DAY

## 2025-01-01 SDOH — HEALTH STABILITY: PHYSICAL HEALTH: ON AVERAGE, HOW MANY MINUTES DO YOU ENGAGE IN EXERCISE AT THIS LEVEL?: 30 MIN

## 2025-01-01 ASSESSMENT — LIFESTYLE VARIABLES
HOW MANY STANDARD DRINKS CONTAINING ALCOHOL DO YOU HAVE ON A TYPICAL DAY: 98
HOW OFTEN DO YOU HAVE A DRINK CONTAINING ALCOHOL: 98
HOW MANY STANDARD DRINKS CONTAINING ALCOHOL DO YOU HAVE ON A TYPICAL DAY: PATIENT DECLINED
HOW OFTEN DO YOU HAVE A DRINK CONTAINING ALCOHOL: PATIENT DECLINED
HOW OFTEN DO YOU HAVE SIX OR MORE DRINKS ON ONE OCCASION: 98

## 2025-01-01 ASSESSMENT — PATIENT HEALTH QUESTIONNAIRE - PHQ9
SUM OF ALL RESPONSES TO PHQ9 QUESTIONS 1 & 2: 0
2. FEELING DOWN, DEPRESSED OR HOPELESS: NOT AT ALL
1. LITTLE INTEREST OR PLEASURE IN DOING THINGS: NOT AT ALL
SUM OF ALL RESPONSES TO PHQ QUESTIONS 1-9: 0

## 2025-01-02 ENCOUNTER — OFFICE VISIT (OUTPATIENT)
Dept: FAMILY MEDICINE CLINIC | Facility: CLINIC | Age: 75
End: 2025-01-02
Payer: MEDICARE

## 2025-01-02 VITALS
SYSTOLIC BLOOD PRESSURE: 171 MMHG | DIASTOLIC BLOOD PRESSURE: 87 MMHG | WEIGHT: 144 LBS | BODY MASS INDEX: 22.6 KG/M2 | HEART RATE: 66 BPM | OXYGEN SATURATION: 99 % | HEIGHT: 67 IN

## 2025-01-02 DIAGNOSIS — Z00.00 MEDICARE ANNUAL WELLNESS VISIT, SUBSEQUENT: Primary | ICD-10-CM

## 2025-01-02 DIAGNOSIS — I10 ESSENTIAL (PRIMARY) HYPERTENSION: ICD-10-CM

## 2025-01-02 DIAGNOSIS — Z01.00 ENCOUNTER FOR ROUTINE EYE AND VISION EXAMINATION: ICD-10-CM

## 2025-01-02 DIAGNOSIS — E78.00 PURE HYPERCHOLESTEROLEMIA, UNSPECIFIED: ICD-10-CM

## 2025-01-02 PROCEDURE — 1160F RVW MEDS BY RX/DR IN RCRD: CPT

## 2025-01-02 PROCEDURE — 3077F SYST BP >= 140 MM HG: CPT

## 2025-01-02 PROCEDURE — G0439 PPPS, SUBSEQ VISIT: HCPCS

## 2025-01-02 PROCEDURE — 3079F DIAST BP 80-89 MM HG: CPT

## 2025-01-02 PROCEDURE — 3017F COLORECTAL CA SCREEN DOC REV: CPT

## 2025-01-02 PROCEDURE — 1159F MED LIST DOCD IN RCRD: CPT

## 2025-01-02 PROCEDURE — 1123F ACP DISCUSS/DSCN MKR DOCD: CPT

## 2025-01-02 RX ORDER — ATORVASTATIN CALCIUM 40 MG/1
40 TABLET, FILM COATED ORAL DAILY
Qty: 90 TABLET | Refills: 1 | Status: SHIPPED | OUTPATIENT
Start: 2025-01-02

## 2025-01-02 RX ORDER — LISINOPRIL 30 MG/1
30 TABLET ORAL DAILY
Qty: 90 TABLET | Refills: 1 | Status: SHIPPED | OUTPATIENT
Start: 2025-01-02

## 2025-01-02 RX ORDER — AMLODIPINE BESYLATE 5 MG/1
5 TABLET ORAL NIGHTLY
Qty: 90 TABLET | Refills: 1 | Status: SHIPPED | OUTPATIENT
Start: 2025-01-02

## 2025-01-02 NOTE — PATIENT INSTRUCTIONS
balance. This can help lower your risk of falling.         Practice fall safety and prevention.   Wear low-heeled shoes that fit well and give your feet good support. Talk to your doctor if you have foot problems that make this hard.  Carry a cellphone or wear a medical alert device that you can use to call for help.  Use stepladders instead of chairs to reach high objects. Don't climb if you're at risk for falls. Ask for help, if needed.  Wear the correct eyeglasses, if you need them.        Make your home safer.   Remove rugs, cords, clutter, and furniture from walkways.  Keep your house well lit. Use night-lights in hallways and bathrooms.  Install and use sturdy handrails on stairways.  Wear nonskid footwear, even inside. Don't walk barefoot or in socks without shoes.        Be safe outside.   Use handrails, curb cuts, and ramps whenever possible.  Keep your hands free by using a shoulder bag or backpack.  Try to walk in well-lit areas. Watch out for uneven ground, changes in pavement, and debris.  Be careful in the winter. Walk on the grass or gravel when sidewalks are slippery. Use de-icer on steps and walkways. Add non-slip devices to shoes.    Put grab bars and nonskid mats in your shower or tub and near the toilet. Try to use a shower chair or bath bench when bathing.   Get into a tub or shower by putting in your weaker leg first. Get out with your strong side first. Have a phone or medical alert device in the bathroom with you.   Where can you learn more?  Go to https://www.[employer].net/patientEd and enter G117 to learn more about \"Preventing Falls: Care Instructions.\"  Current as of: July 17, 2023  Content Version: 14.2  © 2024 [employer].   Care instructions adapted under license by [employer]. If you have questions about a medical condition or this instruction, always ask your healthcare professional. [employer] disclaims any warranty or liability for your use of this

## 2025-01-02 NOTE — PROGRESS NOTES
Medicare Annual Wellness Visit    Sid Serna is here for Medicare AWV    Assessment & Plan   Medicare annual wellness visit, subsequent  Essential (primary) hypertension  The following orders have not been finalized:  -     amLODIPine (NORVASC) 5 MG tablet  -     lisinopril (PRINIVIL;ZESTRIL) 30 MG tablet  Pure hypercholesterolemia, unspecified  The following orders have not been finalized:  -     atorvastatin (LIPITOR) 40 MG tablet       Return if symptoms worsen or fail to improve.     Subjective       Patient's complete Health Risk Assessment and screening values have been reviewed and are found in Flowsheets. The following problems were reviewed today and where indicated follow up appointments were made and/or referrals ordered.    Positive Risk Factor Screenings with Interventions:              Inactivity:  On average, how many days per week do you engage in moderate to strenuous exercise (like a brisk walk)?: 1 day (!) Abnormal  On average, how many minutes do you engage in exercise at this level?: 30 min  Interventions:  Patient declined any further interventions or treatment; pt stays busy and active, but does not have an regular exercise plan. Encouraged 150 min of aerobic activity weekly along with resistance training.        Vision Screen:  Do you have difficulty driving, watching TV, or doing any of your daily activities because of your eyesight?: No  Have you had an eye exam within the past year?: (!) No  Interventions:   Patient encouraged to make appointment with their eye specialist    Safety:  Do you have non-slip mats or non-slip surfaces or shower bars or grab bars in your shower or bathtub?: (!) No  Interventions:  Patient declined any further interventions or treatment     Advanced Directives:  Do you have a Living Will?: (!) No    Intervention:  has an advanced directive - a copy HAS NOT been provided.                      Tobacco Use:    Tobacco Use      Smoking status: Some Days

## 2025-06-17 ENCOUNTER — OFFICE VISIT (OUTPATIENT)
Dept: FAMILY MEDICINE CLINIC | Facility: CLINIC | Age: 75
End: 2025-06-17
Payer: MEDICARE

## 2025-06-17 VITALS
BODY MASS INDEX: 21.91 KG/M2 | TEMPERATURE: 98.2 F | WEIGHT: 139.6 LBS | DIASTOLIC BLOOD PRESSURE: 81 MMHG | HEART RATE: 71 BPM | SYSTOLIC BLOOD PRESSURE: 124 MMHG | HEIGHT: 67 IN

## 2025-06-17 DIAGNOSIS — R05.1 ACUTE COUGH: ICD-10-CM

## 2025-06-17 DIAGNOSIS — J18.9 ATYPICAL PNEUMONIA: Primary | ICD-10-CM

## 2025-06-17 PROCEDURE — 99214 OFFICE O/P EST MOD 30 MIN: CPT | Performed by: FAMILY MEDICINE

## 2025-06-17 PROCEDURE — 3074F SYST BP LT 130 MM HG: CPT | Performed by: FAMILY MEDICINE

## 2025-06-17 PROCEDURE — 3079F DIAST BP 80-89 MM HG: CPT | Performed by: FAMILY MEDICINE

## 2025-06-17 PROCEDURE — 4004F PT TOBACCO SCREEN RCVD TLK: CPT | Performed by: FAMILY MEDICINE

## 2025-06-17 PROCEDURE — 1159F MED LIST DOCD IN RCRD: CPT | Performed by: FAMILY MEDICINE

## 2025-06-17 PROCEDURE — 1123F ACP DISCUSS/DSCN MKR DOCD: CPT | Performed by: FAMILY MEDICINE

## 2025-06-17 PROCEDURE — 3017F COLORECTAL CA SCREEN DOC REV: CPT | Performed by: FAMILY MEDICINE

## 2025-06-17 PROCEDURE — G2211 COMPLEX E/M VISIT ADD ON: HCPCS | Performed by: FAMILY MEDICINE

## 2025-06-17 PROCEDURE — G8427 DOCREV CUR MEDS BY ELIG CLIN: HCPCS | Performed by: FAMILY MEDICINE

## 2025-06-17 PROCEDURE — G8420 CALC BMI NORM PARAMETERS: HCPCS | Performed by: FAMILY MEDICINE

## 2025-06-17 RX ORDER — PREDNISONE 10 MG/1
TABLET ORAL
Qty: 18 TABLET | Refills: 0 | Status: SHIPPED | OUTPATIENT
Start: 2025-06-17 | End: 2025-06-27

## 2025-06-17 RX ORDER — CODEINE PHOSPHATE AND GUAIFENESIN 10; 100 MG/5ML; MG/5ML
5 SOLUTION ORAL 4 TIMES DAILY PRN
Qty: 200 ML | Refills: 0 | Status: SHIPPED | OUTPATIENT
Start: 2025-06-17 | End: 2025-06-27

## 2025-06-17 RX ORDER — AZITHROMYCIN 250 MG/1
TABLET, FILM COATED ORAL
Qty: 6 TABLET | Refills: 0 | Status: SHIPPED | OUTPATIENT
Start: 2025-06-17 | End: 2025-06-22

## 2025-06-17 NOTE — PROGRESS NOTES
[place] [address]  Phone: [phone]  Fax: [phone]  Email: [email or web address]      Encounter Info  Sid Serna; Established patient 74 y.o.male; seen 6/17/2025 for: Chest Congestion (Going for 2 weeks ), Cough (Most at night tried to tried otc dayquil, nyquil and staytussin  ), Fatigue, Anorexia, and eyes draining      Assessment & Plan    1. Atypical pneumonia  -     azithromycin (ZITHROMAX) 250 MG tablet; 2 pills day one, then 1 pill per day X 4 days, Disp-6 tablet, R-0Normal  -     predniSONE (DELTASONE) 10 MG tablet; 3 pills daily X 3 days, then 2 pills daily X 3 days, then 1 pills daily X 2 days, then 1/2 pill daily X 2 days, then stop, Disp-18 tablet, R-0Normal  2. Acute cough  -     guaiFENesin-codeine (GUAIFENESIN AC) 100-10 MG/5ML liquid; Take 5 mLs by mouth 4 times daily as needed for Cough for up to 10 days. Max Daily Amount: 20 mLs, Disp-200 mL, R-0Normal    New Problem: Acute illness with systemic symptoms    Z-connor X 5 D plus low dose steroid taper X 10 D & codeine cough syrup PRN.      Check Out Instructions  Return if symptoms worsen or fail to improve, for With PCP.      Subjective & Objective    HPI  Pt feeling sick for the past 3 weeks; Sx include: productive cough with thick phlegm, fatigue/malaise, decreased appetite, subjective fever/chills, etc. His son who went with him on a recent trip just before pt's Sx started is also having similar Sx.     Review of Systems    Physical Exam  Constitutional:       Appearance: He is ill-appearing.   HENT:      Mouth/Throat:      Pharynx: Pharyngeal swelling and posterior oropharyngeal erythema present.   Cardiovascular:      Rate and Rhythm: Normal rate and regular rhythm.   Pulmonary:      Effort: Pulmonary effort is normal.      Breath sounds: Rhonchi present. No decreased breath sounds, wheezing or rales.   Lymphadenopathy:      Cervical: Cervical adenopathy present.       Vitals:

## 2025-07-02 ENCOUNTER — LAB (OUTPATIENT)
Dept: FAMILY MEDICINE CLINIC | Facility: CLINIC | Age: 75
End: 2025-07-02

## 2025-07-02 ENCOUNTER — OFFICE VISIT (OUTPATIENT)
Dept: FAMILY MEDICINE CLINIC | Facility: CLINIC | Age: 75
End: 2025-07-02
Payer: MEDICARE

## 2025-07-02 VITALS
HEIGHT: 67 IN | DIASTOLIC BLOOD PRESSURE: 74 MMHG | OXYGEN SATURATION: 100 % | BODY MASS INDEX: 21.88 KG/M2 | HEART RATE: 63 BPM | WEIGHT: 139.4 LBS | TEMPERATURE: 96.5 F | SYSTOLIC BLOOD PRESSURE: 132 MMHG

## 2025-07-02 DIAGNOSIS — Z12.5 PROSTATE CANCER SCREENING: ICD-10-CM

## 2025-07-02 DIAGNOSIS — Z13.1 DIABETES MELLITUS SCREENING: ICD-10-CM

## 2025-07-02 DIAGNOSIS — I10 ESSENTIAL (PRIMARY) HYPERTENSION: Primary | ICD-10-CM

## 2025-07-02 DIAGNOSIS — I10 ESSENTIAL (PRIMARY) HYPERTENSION: ICD-10-CM

## 2025-07-02 DIAGNOSIS — E78.00 PURE HYPERCHOLESTEROLEMIA, UNSPECIFIED: ICD-10-CM

## 2025-07-02 DIAGNOSIS — Z87.891 PERSONAL HISTORY OF TOBACCO USE: ICD-10-CM

## 2025-07-02 DIAGNOSIS — Z12.11 COLON CANCER SCREENING: ICD-10-CM

## 2025-07-02 LAB
ALBUMIN SERPL-MCNC: 3.5 G/DL (ref 3.2–4.6)
ALBUMIN/GLOB SERPL: 1 (ref 1–1.9)
ALP SERPL-CCNC: 81 U/L (ref 40–129)
ALT SERPL-CCNC: 68 U/L (ref 8–55)
ANION GAP SERPL CALC-SCNC: 13 MMOL/L (ref 7–16)
APPEARANCE UR: CLEAR
AST SERPL-CCNC: 33 U/L (ref 15–37)
BACTERIA URNS QL MICRO: NEGATIVE /HPF
BASOPHILS # BLD: 0.02 K/UL (ref 0–0.2)
BASOPHILS NFR BLD: 0.2 % (ref 0–2)
BILIRUB SERPL-MCNC: 0.5 MG/DL (ref 0–1.2)
BILIRUB UR QL: NEGATIVE
BUN SERPL-MCNC: 19 MG/DL (ref 8–23)
CALCIUM SERPL-MCNC: 10.2 MG/DL (ref 8.8–10.2)
CHLORIDE SERPL-SCNC: 103 MMOL/L (ref 98–107)
CHOLEST SERPL-MCNC: 195 MG/DL (ref 0–200)
CO2 SERPL-SCNC: 23 MMOL/L (ref 20–29)
COLOR UR: ABNORMAL
CREAT SERPL-MCNC: 1.08 MG/DL (ref 0.8–1.3)
DIFFERENTIAL METHOD BLD: ABNORMAL
EOSINOPHIL # BLD: 0.16 K/UL (ref 0–0.8)
EOSINOPHIL NFR BLD: 2 % (ref 0.5–7.8)
EPI CELLS #/AREA URNS HPF: ABNORMAL /HPF (ref 0–5)
ERYTHROCYTE [DISTWIDTH] IN BLOOD BY AUTOMATED COUNT: 15.1 % (ref 11.9–14.6)
EST. AVERAGE GLUCOSE BLD GHB EST-MCNC: 131 MG/DL
GLOBULIN SER CALC-MCNC: 3.5 G/DL (ref 2.3–3.5)
GLUCOSE SERPL-MCNC: 97 MG/DL (ref 70–99)
GLUCOSE UR STRIP.AUTO-MCNC: NEGATIVE MG/DL
HBA1C MFR BLD: 6.2 % (ref 0–5.6)
HCT VFR BLD AUTO: 45.2 % (ref 41.1–50.3)
HDLC SERPL-MCNC: 74 MG/DL (ref 40–60)
HDLC SERPL: 2.6 (ref 0–5)
HGB BLD-MCNC: 14.4 G/DL (ref 13.6–17.2)
HGB UR QL STRIP: ABNORMAL
HYALINE CASTS URNS QL MICRO: ABNORMAL /LPF
IMM GRANULOCYTES # BLD AUTO: 0.02 K/UL (ref 0–0.5)
IMM GRANULOCYTES NFR BLD AUTO: 0.2 % (ref 0–5)
KETONES UR QL STRIP.AUTO: NEGATIVE MG/DL
LDLC SERPL CALC-MCNC: 101 MG/DL (ref 0–100)
LEUKOCYTE ESTERASE UR QL STRIP.AUTO: NEGATIVE
LYMPHOCYTES # BLD: 3.52 K/UL (ref 0.5–4.6)
LYMPHOCYTES NFR BLD: 42.9 % (ref 13–44)
MCH RBC QN AUTO: 29.3 PG (ref 26.1–32.9)
MCHC RBC AUTO-ENTMCNC: 31.9 G/DL (ref 31.4–35)
MCV RBC AUTO: 92.1 FL (ref 82–102)
MONOCYTES # BLD: 0.62 K/UL (ref 0.1–1.3)
MONOCYTES NFR BLD: 7.6 % (ref 4–12)
NEUTS SEG # BLD: 3.86 K/UL (ref 1.7–8.2)
NEUTS SEG NFR BLD: 47.1 % (ref 43–78)
NITRITE UR QL STRIP.AUTO: NEGATIVE
NRBC # BLD: 0 K/UL (ref 0–0.2)
PH UR STRIP: 5.5 (ref 5–9)
PLATELET # BLD AUTO: 296 K/UL (ref 150–450)
PMV BLD AUTO: 10.1 FL (ref 9.4–12.3)
POTASSIUM SERPL-SCNC: 5.1 MMOL/L (ref 3.5–5.1)
PROT SERPL-MCNC: 7 G/DL (ref 6.3–8.2)
PROT UR STRIP-MCNC: NEGATIVE MG/DL
PSA SERPL-MCNC: 30.7 NG/ML (ref 0–4)
RBC # BLD AUTO: 4.91 M/UL (ref 4.23–5.6)
RBC #/AREA URNS HPF: ABNORMAL /HPF (ref 0–5)
SODIUM SERPL-SCNC: 139 MMOL/L (ref 136–145)
SP GR UR REFRACTOMETRY: 1.02 (ref 1–1.02)
TRIGL SERPL-MCNC: 100 MG/DL (ref 0–150)
TSH W FREE THYROID IF ABNORMAL: 1.46 UIU/ML (ref 0.27–4.2)
UROBILINOGEN UR QL STRIP.AUTO: 0.2 EU/DL (ref 0.2–1)
VLDLC SERPL CALC-MCNC: 20 MG/DL (ref 6–23)
WBC # BLD AUTO: 8.2 K/UL (ref 4.3–11.1)
WBC URNS QL MICRO: ABNORMAL /HPF (ref 0–4)

## 2025-07-02 PROCEDURE — 1123F ACP DISCUSS/DSCN MKR DOCD: CPT

## 2025-07-02 PROCEDURE — G0296 VISIT TO DETERM LDCT ELIG: HCPCS

## 2025-07-02 PROCEDURE — 3017F COLORECTAL CA SCREEN DOC REV: CPT

## 2025-07-02 PROCEDURE — 4004F PT TOBACCO SCREEN RCVD TLK: CPT

## 2025-07-02 PROCEDURE — 3078F DIAST BP <80 MM HG: CPT

## 2025-07-02 PROCEDURE — 3075F SYST BP GE 130 - 139MM HG: CPT

## 2025-07-02 PROCEDURE — 99214 OFFICE O/P EST MOD 30 MIN: CPT

## 2025-07-02 PROCEDURE — 1159F MED LIST DOCD IN RCRD: CPT

## 2025-07-02 PROCEDURE — G2211 COMPLEX E/M VISIT ADD ON: HCPCS

## 2025-07-02 PROCEDURE — G8420 CALC BMI NORM PARAMETERS: HCPCS

## 2025-07-02 PROCEDURE — G8427 DOCREV CUR MEDS BY ELIG CLIN: HCPCS

## 2025-07-02 RX ORDER — AMLODIPINE BESYLATE 5 MG/1
5 TABLET ORAL NIGHTLY
Qty: 90 TABLET | Refills: 1 | Status: SHIPPED | OUTPATIENT
Start: 2025-07-02

## 2025-07-02 RX ORDER — ATORVASTATIN CALCIUM 40 MG/1
40 TABLET, FILM COATED ORAL DAILY
Qty: 90 TABLET | Refills: 1 | Status: SHIPPED | OUTPATIENT
Start: 2025-07-02

## 2025-07-02 RX ORDER — LISINOPRIL 30 MG/1
30 TABLET ORAL DAILY
Qty: 90 TABLET | Refills: 1 | Status: SHIPPED | OUTPATIENT
Start: 2025-07-02

## 2025-07-02 SDOH — HEALTH STABILITY: PHYSICAL HEALTH
ON AVERAGE, HOW MANY DAYS PER WEEK DO YOU ENGAGE IN MODERATE TO STRENUOUS EXERCISE (LIKE A BRISK WALK)?: PATIENT DECLINED

## 2025-07-02 ASSESSMENT — ENCOUNTER SYMPTOMS
COUGH: 0
ABDOMINAL PAIN: 0
TROUBLE SWALLOWING: 0
DIARRHEA: 0
BLOOD IN STOOL: 0
SHORTNESS OF BREATH: 0
NAUSEA: 0
WHEEZING: 0
CONSTIPATION: 0
BACK PAIN: 0
CHEST TIGHTNESS: 0

## 2025-07-02 NOTE — PROGRESS NOTES
Sid Serna (:  1950) is a 74 y.o. male,New patient, here for evaluation of the following chief complaint(s):  Other (NTP/6 month follow up)       .cc  Assessment & Plan  Essential (primary) hypertension   Chronic, at goal (stable), continue current treatment plan, medication adherence emphasized, and lifestyle modifications recommended    Orders:  •  lisinopril (PRINIVIL;ZESTRIL) 30 MG tablet; Take 1 tablet by mouth daily  •  amLODIPine (NORVASC) 5 MG tablet; Take 1 tablet by mouth at bedtime  •  Urinalysis; Future  •  TSH reflex to FT4; Future  •  Lipid Panel; Future  •  Comprehensive Metabolic Panel; Future  •  CBC with Auto Differential; Future    Pure hypercholesterolemia, unspecified   Chronic, at goal (stable), continue current treatment plan, medication adherence emphasized, and lifestyle modifications recommended    Orders:  •  lisinopril (PRINIVIL;ZESTRIL) 30 MG tablet; Take 1 tablet by mouth daily  •  atorvastatin (LIPITOR) 40 MG tablet; Take 1 tablet by mouth daily  •  TSH reflex to FT4; Future  •  Lipid Panel; Future  •  Comprehensive Metabolic Panel; Future  •  CBC with Auto Differential; Future  •  Comprehensive Metabolic Panel; Future  •  Lipid Panel; Future    Personal history of tobacco use   Chronic, at goal (stable), continue current plan pending work up below    Orders:  •  HI VISIT TO DISCUSS LUNG CA SCREEN W LDCT  •  CT Lung Screen (Initial/Annual/Baseline); Future    Prostate cancer screening   Chronic, at goal (stable), continue current plan pending work up below    Orders:  •  PSA Screening; Future    Diabetes mellitus screening   Chronic, at goal (stable), continue current plan pending work up below    Orders:  •  Hemoglobin A1C; Future    Colon cancer screening   Chronic, at goal (stable), continue current plan pending work up below    Orders:  •  [facility] [place]      Return in about 6 months (around 2026) for 6 mo f/u

## 2025-07-03 ENCOUNTER — RESULTS FOLLOW-UP (OUTPATIENT)
Dept: FAMILY MEDICINE CLINIC | Facility: CLINIC | Age: 75
End: 2025-07-03

## 2025-07-03 DIAGNOSIS — R97.20 ELEVATED PSA, GREATER THAN OR EQUAL TO 20 NG/ML: Primary | ICD-10-CM

## 2025-07-03 DIAGNOSIS — R73.03 PREDIABETES: ICD-10-CM

## 2025-07-03 RX ORDER — METFORMIN HYDROCHLORIDE 500 MG/1
500 TABLET, EXTENDED RELEASE ORAL
Qty: 30 TABLET | Refills: 5 | Status: SHIPPED | OUTPATIENT
Start: 2025-07-03

## 2025-07-03 NOTE — TELEPHONE ENCOUNTER
Called pt to discuss elevated PSA - states he has not had any prostatitis sx, fever, or urinary retention. Will refer to urology for further management.    Prediabetes: new dx. He is open to starting metformin. Encouraged low carb/ sugar diet and regular exercise as well.     Pt verbalized understanding.

## 2025-07-11 NOTE — PROGRESS NOTES
NEW PATIENT ABSTRACT      Referral Diagnosis: elevated PSA, greater than or equal to 20 ng/ml    Referring Provider  & Date of Referral: Magdalena Pearce APRN-NP on 7/3/25    Primary Care Provider: Magdalena Pearce APRN - NP    Presenting Symptoms: none reported    Family History of Cancer: Cancer-related family history includes Cancer in his brother and mother.    Past Medical History:   Past Medical History:   Diagnosis Date    Alcohol abuse 9/14/2017    Essential hypertension, benign 1/16/2011    medication    Gastrointestinal disorder     ulcer    GI bleed 5/30/2016    History of stomach ulcers     1991    Hyponatremia 5/30/2016    Leukocytosis 5/30/2016       Background Information: Pt seen by PCP for routine f/u. Referred to Dr. Harris for elevated PSA 30.7.    Pertinent Notes from Referring Provider: none    Other Pertinent Information: none    Most Recent Tests:    Latest Reference Range & Units 07/02/25 10:38   Sodium 136 - 145 mmol/L 139   Potassium 3.5 - 5.1 mmol/L 5.1   Chloride 98 - 107 mmol/L 103   CARBON DIOXIDE 20 - 29 mmol/L 23   BUN,BUNPL 8 - 23 MG/DL 19   Creatinine 0.80 - 1.30 MG/DL 1.08   Anion Gap 7 - 16 mmol/L 13   Est, Glom Filt Rate >60 ml/min/1.73m2 72   Glucose 70 - 99 mg/dL 97   Calcium 8.8 - 10.2 MG/DL 10.2   Albumin/Globulin Ratio 1.0 - 1.9   1.0   Total Protein 6.3 - 8.2 g/dL 7.0   Chol/HDL Ratio 0.0 - 5.0   2.6   Cholesterol, Total 0 - 200 MG/   HDL Cholesterol 40 - 60 MG/DL 74 (H)   LDL Cholesterol 0 - 100 MG/ (H)   Triglycerides 0 - 150 MG/   VLDL 6 - 23 MG/DL 20   Albumin 3.2 - 4.6 g/dL 3.5   Globulin 2.3 - 3.5 g/dL 3.5   Alkaline Phosphatase 40 - 129 U/L 81   ALT 8 - 55 U/L 68 (H)   AST 15 - 37 U/L 33   Total Bilirubin 0.0 - 1.2 MG/DL 0.5   Hemoglobin A1C 0 - 5.6 % 6.2 (H)   eAG (mg/dL) mg/dL 131   TSH w Free Thyroid if Abnormal 0.27 - 4.20 UIU/ML 1.46   pH, Urine 5.0 - 9.0   5.5   WBC 4.3 - 11.1 K/uL 8.2   RBC 4.23 - 5.6 M/uL 4.91   Hemoglobin Quant 13.6 - 17.2

## 2025-07-14 NOTE — PROGRESS NOTES
outlined plan.  Part of this note was written by using a voice dictation software. The note has been proof read but may still contain some grammatical/other typographical errors.      Osiris Dixon PA-C  Urologic Oncology  [facility]

## 2025-07-15 ENCOUNTER — OFFICE VISIT (OUTPATIENT)
Age: 75
End: 2025-07-15
Payer: MEDICARE

## 2025-07-15 VITALS
HEIGHT: 67 IN | OXYGEN SATURATION: 97 % | DIASTOLIC BLOOD PRESSURE: 101 MMHG | WEIGHT: 139 LBS | SYSTOLIC BLOOD PRESSURE: 160 MMHG | BODY MASS INDEX: 21.82 KG/M2 | RESPIRATION RATE: 18 BRPM | HEART RATE: 91 BPM

## 2025-07-15 DIAGNOSIS — R97.20 ELEVATED PSA: Primary | ICD-10-CM

## 2025-07-15 PROCEDURE — 99204 OFFICE O/P NEW MOD 45 MIN: CPT | Performed by: PHYSICIAN ASSISTANT

## 2025-07-15 PROCEDURE — 1160F RVW MEDS BY RX/DR IN RCRD: CPT | Performed by: PHYSICIAN ASSISTANT

## 2025-07-15 PROCEDURE — 1159F MED LIST DOCD IN RCRD: CPT | Performed by: PHYSICIAN ASSISTANT

## 2025-07-15 PROCEDURE — 1126F AMNT PAIN NOTED NONE PRSNT: CPT | Performed by: PHYSICIAN ASSISTANT

## 2025-07-15 PROCEDURE — 3017F COLORECTAL CA SCREEN DOC REV: CPT | Performed by: PHYSICIAN ASSISTANT

## 2025-07-15 PROCEDURE — 1123F ACP DISCUSS/DSCN MKR DOCD: CPT | Performed by: PHYSICIAN ASSISTANT

## 2025-07-15 PROCEDURE — 3080F DIAST BP >= 90 MM HG: CPT | Performed by: PHYSICIAN ASSISTANT

## 2025-07-15 PROCEDURE — G8427 DOCREV CUR MEDS BY ELIG CLIN: HCPCS | Performed by: PHYSICIAN ASSISTANT

## 2025-07-15 PROCEDURE — 4004F PT TOBACCO SCREEN RCVD TLK: CPT | Performed by: PHYSICIAN ASSISTANT

## 2025-07-15 PROCEDURE — G8420 CALC BMI NORM PARAMETERS: HCPCS | Performed by: PHYSICIAN ASSISTANT

## 2025-07-15 PROCEDURE — 3077F SYST BP >= 140 MM HG: CPT | Performed by: PHYSICIAN ASSISTANT

## 2025-07-15 NOTE — PATIENT INSTRUCTIONS
Patient Information from Today's Visit    The members of your [facility] are listed below:    Physician Provider: James Harris, Urologic Oncologist  Advanced Practice Clinician: GROVER Edwards  Nurse Navigator: Adriana KIRKLAND RN  Medical Assistant: Rachael ALVA MA  :Jessica KIRKLAND  Supportive Care Services: Joellen DAWN LMSW    Diagnosis : Elevated PSA    Follow Up Instructions:    New Patient office visit today  Reviewed if any symptoms and issues  Reviewed family history of Prostate Cancer  Reviewed your PSA levels  During your prostate exam -I don't feel anything concerning but, it does feels large during your exam  We will recommend a MRI in the next 2-4 weeks and follow up with us after to go over results.   *MRI prior to office visit - you can call the following number to schedule this if you do not hear from them within 1-2 weeks of your appointment.    You can call to schedule this at [phone].   We will see you back in 3-4 weeks and you will see Dr. Harris when you come back to see us        Current Labs:    Results for orders placed or performed in visit on 07/02/25   PSA Screening   Result Value Ref Range    PSA 30.7 (H) 0.0 - 4.0 ng/mL   CBC with Auto Differential   Result Value Ref Range    WBC 8.2 4.3 - 11.1 K/uL    RBC 4.91 4.23 - 5.6 M/uL    Hemoglobin 14.4 13.6 - 17.2 g/dL    Hematocrit 45.2 41.1 - 50.3 %    MCV 92.1 82 - 102 FL    MCH 29.3 26.1 - 32.9 PG    MCHC 31.9 31.4 - 35.0 g/dL    RDW 15.1 (H) 11.9 - 14.6 %    Platelets 296 150 - 450 K/uL    MPV 10.1 9.4 - 12.3 FL    nRBC 0.00 0.0 - 0.2 K/uL    Differential Type AUTOMATED      Neutrophils % 47.1 43.0 - 78.0 %    Lymphocytes % 42.9 13.0 - 44.0 %    Monocytes % 7.6 4.0 - 12.0 %    Eosinophils % 2.0 0.5 - 7.8 %    Basophils % 0.2 0.0 - 2.0 %    Immature Granulocytes % 0.2 0.0 - 5.0 %    Neutrophils Absolute 3.86 1.70 - 8.20 K/UL    Lymphocytes Absolute 3.52 0.50 - 4.60 K/UL    Monocytes Absolute 0.62 0.10 - 1.30 K/UL

## 2025-07-22 ENCOUNTER — HOSPITAL ENCOUNTER (OUTPATIENT)
Dept: CT IMAGING | Age: 75
Discharge: HOME | End: 2025-07-25
Payer: MEDICARE

## 2025-07-22 ENCOUNTER — HOSPITAL ENCOUNTER (OUTPATIENT)
Dept: MRI IMAGING | Age: 75
Discharge: HOME | End: 2025-07-25
Payer: MEDICARE

## 2025-07-22 DIAGNOSIS — R97.20 ELEVATED PSA: ICD-10-CM

## 2025-07-22 DIAGNOSIS — Z87.891 PERSONAL HISTORY OF TOBACCO USE: ICD-10-CM

## 2025-07-22 PROCEDURE — 72197 MRI PELVIS W/O & W/DYE: CPT

## 2025-07-22 PROCEDURE — A9579 GAD-BASE MR CONTRAST NOS,1ML: HCPCS | Performed by: PHYSICIAN ASSISTANT

## 2025-07-22 PROCEDURE — 6360000004 HC RX CONTRAST MEDICATION: Performed by: PHYSICIAN ASSISTANT

## 2025-07-22 PROCEDURE — 71271 CT THORAX LUNG CANCER SCR C-: CPT

## 2025-07-22 RX ORDER — GADOTERIDOL 279.3 MG/ML
13 INJECTION INTRAVENOUS
Status: COMPLETED | OUTPATIENT
Start: 2025-07-22 | End: 2025-07-22

## 2025-07-22 RX ADMIN — GADOTERIDOL 13 ML: 279.3 INJECTION, SOLUTION INTRAVENOUS at 07:04

## 2025-07-29 ENCOUNTER — TELEPHONE (OUTPATIENT)
Dept: RADIATION ONCOLOGY | Age: 75
End: 2025-07-29

## 2025-07-29 PROBLEM — Z12.11 ENCOUNTER FOR SCREENING FOR MALIGNANT NEOPLASM OF COLON: Status: ACTIVE | Noted: 2025-07-29

## 2025-07-29 NOTE — TELEPHONE ENCOUNTER
Physician provider: Dr. Harris  Reason for today's call (Please detail here patients chief complaint):  Pao the  daughter states someone called the patient telling him to call [facility]. The patient couldn't remember who called him. Pao request a call back.     Last office visit:na  Patient Callback Number: [phone]  Was callback number verified?: Yes  Preferred pharmacy (If refill request): na  Veriified that patient confirmed no refills left at pharmacy? :No  Has the patient called the office for this concern within the last 48 hours?:No    Red Word Mentioned  Warm Transfer Phone Call to (Name): na    Patient notified that their information will be routed to the appropriate team for review. Patient is advised that they will receive a phone call from the appropriate department. If awaiting a call from the triage department and symptoms worsen before receiving a call back, the patient has been advised to proceed to the nearest ED.

## 2025-07-29 NOTE — TELEPHONE ENCOUNTER
Confirmed appt on 8/8/25. MRI already done. No other appts needed from our end. Verbalized understanding.

## 2025-08-08 ENCOUNTER — OFFICE VISIT (OUTPATIENT)
Age: 75
End: 2025-08-08
Payer: MEDICARE

## 2025-08-08 ENCOUNTER — HOSPITAL ENCOUNTER (OUTPATIENT)
Dept: LAB | Age: 75
Discharge: HOME | End: 2025-08-08
Payer: MEDICARE

## 2025-08-08 VITALS
RESPIRATION RATE: 16 BRPM | SYSTOLIC BLOOD PRESSURE: 140 MMHG | BODY MASS INDEX: 22.18 KG/M2 | DIASTOLIC BLOOD PRESSURE: 90 MMHG | TEMPERATURE: 97.5 F | HEIGHT: 66 IN | OXYGEN SATURATION: 99 % | HEART RATE: 70 BPM | WEIGHT: 138 LBS

## 2025-08-08 DIAGNOSIS — R97.20 ELEVATED PSA: Primary | ICD-10-CM

## 2025-08-08 DIAGNOSIS — R97.20 ELEVATED PSA: ICD-10-CM

## 2025-08-08 LAB — PSA SERPL-MCNC: 19.3 NG/ML (ref 0–4)

## 2025-08-08 PROCEDURE — 36415 COLL VENOUS BLD VENIPUNCTURE: CPT

## 2025-08-08 PROCEDURE — 1160F RVW MEDS BY RX/DR IN RCRD: CPT | Performed by: UROLOGY

## 2025-08-08 PROCEDURE — 3080F DIAST BP >= 90 MM HG: CPT | Performed by: UROLOGY

## 2025-08-08 PROCEDURE — 3077F SYST BP >= 140 MM HG: CPT | Performed by: UROLOGY

## 2025-08-08 PROCEDURE — 84153 ASSAY OF PSA TOTAL: CPT

## 2025-08-08 PROCEDURE — 3017F COLORECTAL CA SCREEN DOC REV: CPT | Performed by: UROLOGY

## 2025-08-08 PROCEDURE — 1123F ACP DISCUSS/DSCN MKR DOCD: CPT | Performed by: UROLOGY

## 2025-08-08 PROCEDURE — 4004F PT TOBACCO SCREEN RCVD TLK: CPT | Performed by: UROLOGY

## 2025-08-08 PROCEDURE — G8427 DOCREV CUR MEDS BY ELIG CLIN: HCPCS | Performed by: UROLOGY

## 2025-08-08 PROCEDURE — G8420 CALC BMI NORM PARAMETERS: HCPCS | Performed by: UROLOGY

## 2025-08-08 PROCEDURE — 1126F AMNT PAIN NOTED NONE PRSNT: CPT | Performed by: UROLOGY

## 2025-08-08 PROCEDURE — 1159F MED LIST DOCD IN RCRD: CPT | Performed by: UROLOGY

## 2025-08-08 PROCEDURE — 99214 OFFICE O/P EST MOD 30 MIN: CPT | Performed by: UROLOGY

## 2025-08-08 ASSESSMENT — ENCOUNTER SYMPTOMS
RESPIRATORY NEGATIVE: 1
GASTROINTESTINAL NEGATIVE: 1

## 2025-08-08 ASSESSMENT — PATIENT HEALTH QUESTIONNAIRE - PHQ9
SUM OF ALL RESPONSES TO PHQ QUESTIONS 1-9: 0
1. LITTLE INTEREST OR PLEASURE IN DOING THINGS: NOT AT ALL
2. FEELING DOWN, DEPRESSED OR HOPELESS: NOT AT ALL
SUM OF ALL RESPONSES TO PHQ QUESTIONS 1-9: 0

## 2025-08-11 PROBLEM — R97.20 ELEVATED PSA: Status: ACTIVE | Noted: 2025-08-11

## 2025-08-12 ENCOUNTER — ANESTHESIA (OUTPATIENT)
Dept: ENDOSCOPY | Age: 75
End: 2025-08-12
Payer: MEDICARE

## 2025-08-12 ENCOUNTER — ANESTHESIA EVENT (OUTPATIENT)
Dept: ENDOSCOPY | Age: 75
End: 2025-08-12
Payer: MEDICARE

## 2025-08-12 ENCOUNTER — HOSPITAL ENCOUNTER (OUTPATIENT)
Age: 75
Discharge: HOME | End: 2025-08-12
Attending: INTERNAL MEDICINE | Admitting: INTERNAL MEDICINE
Payer: MEDICARE

## 2025-08-12 VITALS
WEIGHT: 140 LBS | HEART RATE: 45 BPM | SYSTOLIC BLOOD PRESSURE: 122 MMHG | TEMPERATURE: 98.6 F | DIASTOLIC BLOOD PRESSURE: 58 MMHG | RESPIRATION RATE: 13 BRPM | HEIGHT: 66 IN | OXYGEN SATURATION: 100 % | BODY MASS INDEX: 22.5 KG/M2

## 2025-08-12 DIAGNOSIS — Z12.11 ENCOUNTER FOR SCREENING FOR MALIGNANT NEOPLASM OF COLON: ICD-10-CM

## 2025-08-12 PROBLEM — D12.2 ADENOMATOUS POLYP OF ASCENDING COLON: Status: ACTIVE | Noted: 2025-08-12

## 2025-08-12 PROCEDURE — 7100000011 HC PHASE II RECOVERY - ADDTL 15 MIN: Performed by: INTERNAL MEDICINE

## 2025-08-12 PROCEDURE — 2709999900 HC NON-CHARGEABLE SUPPLY: Performed by: INTERNAL MEDICINE

## 2025-08-12 PROCEDURE — 7100000010 HC PHASE II RECOVERY - FIRST 15 MIN: Performed by: INTERNAL MEDICINE

## 2025-08-12 PROCEDURE — 3700000000 HC ANESTHESIA ATTENDED CARE: Performed by: INTERNAL MEDICINE

## 2025-08-12 PROCEDURE — 3609010200 HC COLONOSCOPY ABLATION TUMOR POLYP/OTHER LES: Performed by: INTERNAL MEDICINE

## 2025-08-12 PROCEDURE — 88305 TISSUE EXAM BY PATHOLOGIST: CPT

## 2025-08-12 PROCEDURE — 3700000001 HC ADD 15 MINUTES (ANESTHESIA): Performed by: INTERNAL MEDICINE

## 2025-08-12 PROCEDURE — 2580000003 HC RX 258: Performed by: INTERNAL MEDICINE

## 2025-08-12 PROCEDURE — 6360000002 HC RX W HCPCS: Performed by: NURSE ANESTHETIST, CERTIFIED REGISTERED

## 2025-08-12 RX ORDER — LIDOCAINE HYDROCHLORIDE 20 MG/ML
INJECTION, SOLUTION EPIDURAL; INFILTRATION; INTRACAUDAL; PERINEURAL
Status: DISCONTINUED | OUTPATIENT
Start: 2025-08-12 | End: 2025-08-12 | Stop reason: SDUPTHER

## 2025-08-12 RX ORDER — SODIUM CHLORIDE 0.9 % (FLUSH) 0.9 %
5-40 SYRINGE (ML) INJECTION PRN
Status: DISCONTINUED | OUTPATIENT
Start: 2025-08-12 | End: 2025-08-12 | Stop reason: HOSPADM

## 2025-08-12 RX ORDER — SODIUM CHLORIDE 0.9 % (FLUSH) 0.9 %
5-40 SYRINGE (ML) INJECTION EVERY 12 HOURS SCHEDULED
Status: DISCONTINUED | OUTPATIENT
Start: 2025-08-12 | End: 2025-08-12 | Stop reason: HOSPADM

## 2025-08-12 RX ORDER — SODIUM CHLORIDE, SODIUM LACTATE, POTASSIUM CHLORIDE, CALCIUM CHLORIDE 600; 310; 30; 20 MG/100ML; MG/100ML; MG/100ML; MG/100ML
INJECTION, SOLUTION INTRAVENOUS CONTINUOUS
Status: DISCONTINUED | OUTPATIENT
Start: 2025-08-12 | End: 2025-08-12 | Stop reason: HOSPADM

## 2025-08-12 RX ORDER — PROPOFOL 10 MG/ML
INJECTION, EMULSION INTRAVENOUS
Status: DISCONTINUED | OUTPATIENT
Start: 2025-08-12 | End: 2025-08-12 | Stop reason: SDUPTHER

## 2025-08-12 RX ORDER — SODIUM CHLORIDE 9 MG/ML
25 INJECTION, SOLUTION INTRAVENOUS PRN
Status: DISCONTINUED | OUTPATIENT
Start: 2025-08-12 | End: 2025-08-12 | Stop reason: HOSPADM

## 2025-08-12 RX ADMIN — PROPOFOL 90 MG: 10 INJECTION, EMULSION INTRAVENOUS at 07:10

## 2025-08-12 RX ADMIN — PROPOFOL 160 MCG/KG/MIN: 10 INJECTION, EMULSION INTRAVENOUS at 07:11

## 2025-08-12 RX ADMIN — LIDOCAINE HYDROCHLORIDE 50 MG: 20 INJECTION, SOLUTION EPIDURAL; INFILTRATION; INTRACAUDAL; PERINEURAL at 07:10

## 2025-08-12 RX ADMIN — SODIUM CHLORIDE, SODIUM LACTATE, POTASSIUM CHLORIDE, AND CALCIUM CHLORIDE: 600; 310; 30; 20 INJECTION, SOLUTION INTRAVENOUS at 06:48

## 2025-08-12 ASSESSMENT — ENCOUNTER SYMPTOMS
DIARRHEA: 0
CONSTIPATION: 0
ABDOMINAL PAIN: 0
SHORTNESS OF BREATH: 0
VOMITING: 0
COLOR CHANGE: 0
BLOOD IN STOOL: 0
TROUBLE SWALLOWING: 0
ABDOMINAL DISTENTION: 0
NAUSEA: 0

## 2025-08-12 ASSESSMENT — LIFESTYLE VARIABLES: SMOKING_STATUS: 1

## 2025-08-12 ASSESSMENT — PAIN SCALES - GENERAL
PAINLEVEL_OUTOF10: 0

## (undated) DEVICE — SYRINGE MED 10ML LUERLOCK TIP W/O SFTY DISP

## (undated) DEVICE — ELECTRODE EKG 1 3/8X17/8IN SQ SHP AD FOAM BK SNAP CONN GEL

## (undated) DEVICE — MANIFOLD SUCT SMK EVAC SGL PRT DISP NEPTUNE 2

## (undated) DEVICE — CONTAINER FORMALIN PREFILLED 10% NBF 60ML

## (undated) DEVICE — ENDOSCOPIC KIT 1.1+ OP4 CA DE 2 GWN AAMI LEVEL 3

## (undated) DEVICE — SYR 5ML 1/5 GRAD LL NSAF LF --

## (undated) DEVICE — FORCEPS BX L240CM JAW DIA2.8MM L CAP W/ NDL MIC MESH TOOTH

## (undated) DEVICE — LUBE JELLY FOIL PACK 1.4 OZ

## (undated) DEVICE — SYR 3ML LL TIP 1/10ML GRAD --

## (undated) DEVICE — NEEDLE SYRINGE 18GA L1.5IN RED PLAS HUB S STL BLNT FILL W/O

## (undated) DEVICE — KENDALL RADIOLUCENT FOAM MONITORING ELECTRODE RECTANGULAR SHAPE: Brand: KENDALL

## (undated) DEVICE — CANNULA NSL ORAL AD FOR CAPNOFLEX CO2 O2 AIRLFE

## (undated) DEVICE — SYRINGE MEDICAL 3ML CLEAR PLASTIC STANDARD NON CONTROL LUERLOCK TIP DISPOSABLE

## (undated) DEVICE — REM POLYHESIVE ADULT PATIENT RETURN ELECTRODE: Brand: VALLEYLAB

## (undated) DEVICE — SNARE POLYP SM W13MMXL240CM SHTH DIA2.4MM OVL FLX DISP

## (undated) DEVICE — CONTAINER PREFIL FRMLN 40ML --

## (undated) DEVICE — Device

## (undated) DEVICE — SOLUTION IRRIG 1000ML H2O PIC PLAS SHATTERPROOF CONTAINER

## (undated) DEVICE — CONNECTOR TBNG OD5-7MM O2 END DISP

## (undated) DEVICE — SPONGE GZ W4XL4IN RAYON POLY CONSTRUCTED WV FINISHED EDGE

## (undated) DEVICE — TUBING O2 L7FT CRUSH RESIST

## (undated) DEVICE — NDL PRT INJ NSAF BLNT 18GX1.5 --

## (undated) DEVICE — FORCEPS BX JUMBO L240CM DIA2.8MM WRK CHN 3.2MM ORNG W/ NDL